# Patient Record
Sex: FEMALE | Race: WHITE | Employment: FULL TIME | URBAN - METROPOLITAN AREA
[De-identification: names, ages, dates, MRNs, and addresses within clinical notes are randomized per-mention and may not be internally consistent; named-entity substitution may affect disease eponyms.]

---

## 2017-01-04 PROBLEM — O13.3 PREGNANCY-INDUCED HYPERTENSION IN THIRD TRIMESTER: Status: ACTIVE | Noted: 2017-01-04

## 2017-03-30 ENCOUNTER — EMPLOYEE WELLNESS (OUTPATIENT)
Dept: OTHER | Age: 27
End: 2017-03-30

## 2017-05-15 ENCOUNTER — OFFICE VISIT (OUTPATIENT)
Dept: FAMILY MEDICINE CLINIC | Age: 27
End: 2017-05-15

## 2017-05-15 VITALS
HEART RATE: 78 BPM | HEIGHT: 64 IN | SYSTOLIC BLOOD PRESSURE: 128 MMHG | BODY MASS INDEX: 28.44 KG/M2 | WEIGHT: 166.6 LBS | DIASTOLIC BLOOD PRESSURE: 72 MMHG | OXYGEN SATURATION: 98 %

## 2017-05-15 DIAGNOSIS — K59.00 CONSTIPATION, UNSPECIFIED CONSTIPATION TYPE: ICD-10-CM

## 2017-05-15 DIAGNOSIS — R19.8 ABDOMINAL WEAKNESS: Primary | ICD-10-CM

## 2017-05-15 PROBLEM — O13.3 PREGNANCY-INDUCED HYPERTENSION IN THIRD TRIMESTER: Status: RESOLVED | Noted: 2017-01-04 | Resolved: 2017-05-15

## 2017-05-15 PROCEDURE — 99203 OFFICE O/P NEW LOW 30 MIN: CPT | Performed by: FAMILY MEDICINE

## 2017-05-15 ASSESSMENT — PATIENT HEALTH QUESTIONNAIRE - PHQ9
SUM OF ALL RESPONSES TO PHQ9 QUESTIONS 1 & 2: 0
2. FEELING DOWN, DEPRESSED OR HOPELESS: 0
1. LITTLE INTEREST OR PLEASURE IN DOING THINGS: 0
SUM OF ALL RESPONSES TO PHQ QUESTIONS 1-9: 0

## 2017-07-27 ENCOUNTER — ANESTHESIA - HEALTHEAST (OUTPATIENT)
Dept: SURGERY | Facility: HOSPITAL | Age: 27
End: 2017-07-27

## 2017-07-27 ASSESSMENT — MIFFLIN-ST. JEOR: SCORE: 1468.28

## 2017-07-28 ENCOUNTER — SURGERY - HEALTHEAST (OUTPATIENT)
Dept: SURGERY | Facility: HOSPITAL | Age: 27
End: 2017-07-28

## 2017-08-18 ENCOUNTER — OFFICE VISIT (OUTPATIENT)
Dept: FAMILY MEDICINE CLINIC | Age: 27
End: 2017-08-18

## 2017-08-18 VITALS
RESPIRATION RATE: 16 BRPM | SYSTOLIC BLOOD PRESSURE: 118 MMHG | BODY MASS INDEX: 28.2 KG/M2 | OXYGEN SATURATION: 98 % | HEIGHT: 64 IN | WEIGHT: 165.2 LBS | HEART RATE: 80 BPM | DIASTOLIC BLOOD PRESSURE: 84 MMHG

## 2017-08-18 DIAGNOSIS — L50.9 URTICARIA: Primary | ICD-10-CM

## 2017-08-18 PROCEDURE — 99212 OFFICE O/P EST SF 10 MIN: CPT | Performed by: FAMILY MEDICINE

## 2017-08-18 RX ORDER — CLOBETASOL PROPIONATE 0.5 MG/G
CREAM TOPICAL 2 TIMES DAILY PRN
Status: ON HOLD | COMMUNITY
End: 2018-11-18 | Stop reason: HOSPADM

## 2017-08-18 RX ORDER — DIAPER,BRIEF,INFANT-TODD,DISP
EACH MISCELLANEOUS 2 TIMES DAILY PRN
Status: ON HOLD | COMMUNITY
End: 2018-11-18 | Stop reason: HOSPADM

## 2017-08-18 RX ORDER — NORETHINDRONE 0.35 MG/1
TABLET ORAL
Refills: 10 | COMMUNITY
Start: 2017-08-07 | End: 2017-12-07

## 2017-12-07 ENCOUNTER — OFFICE VISIT (OUTPATIENT)
Dept: FAMILY MEDICINE CLINIC | Age: 27
End: 2017-12-07

## 2017-12-07 VITALS
DIASTOLIC BLOOD PRESSURE: 70 MMHG | OXYGEN SATURATION: 96 % | RESPIRATION RATE: 14 BRPM | BODY MASS INDEX: 28.36 KG/M2 | HEART RATE: 93 BPM | WEIGHT: 165.2 LBS | TEMPERATURE: 98.3 F | SYSTOLIC BLOOD PRESSURE: 116 MMHG

## 2017-12-07 DIAGNOSIS — J06.9 VIRAL URI: Primary | ICD-10-CM

## 2017-12-07 PROCEDURE — 99213 OFFICE O/P EST LOW 20 MIN: CPT | Performed by: FAMILY MEDICINE

## 2017-12-07 RX ORDER — NORETHINDRONE ACETATE AND ETHINYL ESTRADIOL AND FERROUS FUMARATE 1MG-20(21)
KIT ORAL
Refills: 3 | Status: ON HOLD | COMMUNITY
Start: 2017-10-18 | End: 2018-11-18 | Stop reason: HOSPADM

## 2017-12-07 NOTE — PROGRESS NOTES
SUBJECTIVE:   Robin Montiel is a 28 y.o. female who complains of congestion, sore throat, dry cough, myalgias, headache and nausea and subjective fevers for 3-4 days. She denies a history of chills, vomiting and wheezing. She has been using OTC decongestant and advil at home for the symptoms with and without success. Patient does not smoke cigarettes. Patient has been exposed to someone with similar symptoms. Patient's medications, allergies, past medical, surgical, social and family histories were reviewed and updated as appropriate. Review of Systems  Constitutional: negative for fevers and weight loss  Eyes: negative for irritation and redness  Ears, nose, mouth, throat, and face: negative except for as noted above  Respiratory: negative for hemoptysis and shortness of breath  Gastrointestinal: negative for diarrhea and vomiting     OBJECTIVE:  /70 (Site: Right Arm, Position: Sitting, Cuff Size: Small Adult)   Pulse 93   Temp 98.3 °F (36.8 °C) (Oral)   Resp 14   Wt 165 lb 3.2 oz (74.9 kg)   SpO2 96%   BMI 28.36 kg/m²   She appears well, NARD. TMs normal.  Throat and pharynx normal, without postnasal drip. Rhinorrhea noted. Sinuses are not tender. Neck supple, without adenopathy in the neck. CV: RRR, S1/S2, without m/r/g. Lungs:  clear, without wheezes or rales. ASSESSMENT:   1. Viral URI        PLAN:  1. Supportive/symptomatic care reviewed with the patient. 2. Medications advised: OTC Meds discussed   3. Call or return to clinic 3-5 days if these symptoms worsen or fail to improve as anticipated.

## 2018-02-19 ENCOUNTER — OFFICE VISIT (OUTPATIENT)
Dept: FAMILY MEDICINE CLINIC | Age: 28
End: 2018-02-19

## 2018-02-19 VITALS
WEIGHT: 158.6 LBS | RESPIRATION RATE: 15 BRPM | OXYGEN SATURATION: 98 % | TEMPERATURE: 98.2 F | BODY MASS INDEX: 27.08 KG/M2 | DIASTOLIC BLOOD PRESSURE: 76 MMHG | HEART RATE: 108 BPM | HEIGHT: 64 IN | SYSTOLIC BLOOD PRESSURE: 118 MMHG

## 2018-02-19 DIAGNOSIS — J06.9 VIRAL URI: Primary | ICD-10-CM

## 2018-02-19 LAB
INFLUENZA A ANTIBODY: NORMAL
INFLUENZA B ANTIBODY: NORMAL

## 2018-02-19 PROCEDURE — 87804 INFLUENZA ASSAY W/OPTIC: CPT | Performed by: FAMILY MEDICINE

## 2018-02-19 PROCEDURE — 99213 OFFICE O/P EST LOW 20 MIN: CPT | Performed by: FAMILY MEDICINE

## 2018-02-19 RX ORDER — OSELTAMIVIR PHOSPHATE 75 MG/1
75 CAPSULE ORAL 2 TIMES DAILY
Qty: 10 CAPSULE | Refills: 0 | Status: SHIPPED | OUTPATIENT
Start: 2018-02-19 | End: 2018-02-24

## 2018-02-19 RX ORDER — OSELTAMIVIR PHOSPHATE 75 MG/1
75 CAPSULE ORAL 2 TIMES DAILY
Qty: 10 CAPSULE | Refills: 0 | Status: SHIPPED | OUTPATIENT
Start: 2018-02-19 | End: 2018-02-19 | Stop reason: SDUPTHER

## 2018-02-19 NOTE — PROGRESS NOTES
rhythm and normal heart sounds. No murmur heard. RESPIRATORY: Lungs are clear to auscultation without crackles, wheezes, or rhonchi. Breathing is unlabored. ABDOMEN: No tenderness or guarding or masses felt. Bowel sounds normal       Assessment/Plan:   1. Viral URI  Negative for flu   Symtomatic treatment for the URI symptoms: Tylenol / Advil, salt water gargles, increase fluids. May also use: acetaminophen, ibuprofen, Robitussin DM or Delsym. Can make appointment of symptoms worsen or fail to improve over the next 3-4 days. Most viral illnesses last 7-10 days, and antibiotics do not help.     - POCT Influenza A/B                 Dwain Duverney  2/19/2018 9:43 AM

## 2018-02-19 NOTE — TELEPHONE ENCOUNTER
Patient states that her prescription for Tamiflu was sent to [facility] in error. Please resubmit to [facility].

## 2018-03-12 ENCOUNTER — OFFICE VISIT (OUTPATIENT)
Dept: FAMILY MEDICINE CLINIC | Age: 28
End: 2018-03-12

## 2018-03-12 VITALS
DIASTOLIC BLOOD PRESSURE: 72 MMHG | OXYGEN SATURATION: 98 % | HEART RATE: 74 BPM | WEIGHT: 158.2 LBS | SYSTOLIC BLOOD PRESSURE: 126 MMHG | BODY MASS INDEX: 27.15 KG/M2

## 2018-03-12 DIAGNOSIS — L60.0 INGROWN NAIL OF RIGHT RING FINGER: Primary | ICD-10-CM

## 2018-03-12 PROCEDURE — 99213 OFFICE O/P EST LOW 20 MIN: CPT | Performed by: FAMILY MEDICINE

## 2018-03-12 RX ORDER — CRISABOROLE 20 MG/G
OINTMENT TOPICAL
Refills: 5 | Status: ON HOLD | COMMUNITY
Start: 2018-03-08 | End: 2018-11-18 | Stop reason: HOSPADM

## 2018-03-12 RX ORDER — CEPHALEXIN 500 MG/1
500 CAPSULE ORAL 3 TIMES DAILY
Qty: 21 CAPSULE | Refills: 0 | Status: SHIPPED | OUTPATIENT
Start: 2018-03-12 | End: 2018-03-19

## 2018-03-12 NOTE — PROGRESS NOTES
Chief Complaint: Finger Injury (right finger blue, tried neosporin, soaking, swollen and purple for x 2weeks) and Otitis Media (fluid in her right ear, discomfort)       HPI  Kirstie Vann is a 28 y.o. female presenting with swelling and redness in her right ring finger. She has soaked and applied topical antibiotics and nothing has helped. She does get ingrown nails. She also recently had flu since then her Ears  been hurting. She wanted to get checked. Patient's problem list, medications, allergies, past medical, surgical, social and family histories were reviewed and updated as appropriate. Social History   Substance Use Topics    Smoking status: Never Smoker    Smokeless tobacco: Never Used    Alcohol use 2.4 oz/week     4 Standard drinks or equivalent per week        Review of Systems   ROS:  Constitutional: Negative for appetite change, unexpected weight change and fatigue. HENT: as mentioned in the HPI    Eyes: Negative for photophobia, discharge, redness and visual disturbance. Respiratory: Negative  Cardiovascular: Negative for chest pain, palpitations and leg swelling. Gastrointestinal: Negative for abdominal pain, blood in stool, constipation, diarrhea, nausea and vomiting. Musculoskeletal: Negative for gait problem, joint swelling and myalgias. Skin: As mentioned above     Objective:     Vitals:    03/12/18 1619   BP: 126/72   Pulse: 74   SpO2: 98%   Weight: 158 lb 3.2 oz (71.8 kg)        Physical Exam  GENERAL: She is oriented to person, place, and time. She appears well-developed and well-nourished, and in no acute distress. HEAD: Normocephalic, atraumatic. EYES: Right eye clear. Left eye clear. Pupils are equal, round, and reactive to light. EARS: Right:TM appears Normal and minimal fluid noticed . External canal appears normal.  Left:TM appears normal  NOSE: normal  THROAT: Uvula is midline, oropharynx is clear and moist and with mild erythema.   No tonsillar

## 2018-03-20 VITALS — BODY MASS INDEX: 29.05 KG/M2 | WEIGHT: 164 LBS

## 2018-04-05 ENCOUNTER — EMPLOYEE WELLNESS (OUTPATIENT)
Dept: OTHER | Age: 28
End: 2018-04-05

## 2018-04-05 LAB
CHOLESTEROL, TOTAL: 150 MG/DL (ref 0–199)
GLUCOSE BLD-MCNC: 84 MG/DL (ref 70–99)
HDLC SERPL-MCNC: 61 MG/DL (ref 40–60)
LDL CHOLESTEROL CALCULATED: 82 MG/DL
TRIGL SERPL-MCNC: 37 MG/DL (ref 0–150)

## 2018-04-10 VITALS — BODY MASS INDEX: 26.61 KG/M2 | WEIGHT: 155 LBS

## 2018-04-13 LAB
ABO/RH: NORMAL
ANTIBODY SCREEN: NORMAL
HEPATITIS C ANTIBODY INTERPRETATION: NORMAL

## 2018-04-14 LAB
BASOPHILS ABSOLUTE: 0 K/UL (ref 0–0.2)
BASOPHILS RELATIVE PERCENT: 0.3 %
EOSINOPHILS ABSOLUTE: 0.2 K/UL (ref 0–0.6)
EOSINOPHILS RELATIVE PERCENT: 2 %
HCT VFR BLD CALC: 39 % (ref 36–48)
HEMOGLOBIN: 13.3 G/DL (ref 12–16)
HEPATITIS B SURFACE ANTIGEN INTERPRETATION: NORMAL
LYMPHOCYTES ABSOLUTE: 2.4 K/UL (ref 1–5.1)
LYMPHOCYTES RELATIVE PERCENT: 30.5 %
MCH RBC QN AUTO: 29.5 PG (ref 26–34)
MCHC RBC AUTO-ENTMCNC: 34.1 G/DL (ref 31–36)
MCV RBC AUTO: 86.5 FL (ref 80–100)
MONOCYTES ABSOLUTE: 0.6 K/UL (ref 0–1.3)
MONOCYTES RELATIVE PERCENT: 8 %
NEUTROPHILS ABSOLUTE: 4.7 K/UL (ref 1.7–7.7)
NEUTROPHILS RELATIVE PERCENT: 59.2 %
PDW BLD-RTO: 13.3 % (ref 12.4–15.4)
PLATELET # BLD: 245 K/UL (ref 135–450)
PMV BLD AUTO: 9.4 FL (ref 5–10.5)
RBC # BLD: 4.51 M/UL (ref 4–5.2)
RPR: NORMAL
RUBELLA ANTIBODY IGG: 113 IU/ML
WBC # BLD: 8 K/UL (ref 4–11)

## 2018-04-16 LAB
HIV AG/AB: NORMAL
HIV ANTIGEN: NORMAL
HIV-1 ANTIBODY: NORMAL
HIV-2 AB: NORMAL

## 2018-08-23 LAB
BASOPHILS ABSOLUTE: 0 K/UL (ref 0–0.2)
BASOPHILS RELATIVE PERCENT: 0.3 %
EOSINOPHILS ABSOLUTE: 0.2 K/UL (ref 0–0.6)
EOSINOPHILS RELATIVE PERCENT: 2.6 %
GLUCOSE CHALLENGE: 121 MG/DL
HCT VFR BLD CALC: 37.4 % (ref 36–48)
HEMOGLOBIN: 12.5 G/DL (ref 12–16)
LYMPHOCYTES ABSOLUTE: 2 K/UL (ref 1–5.1)
LYMPHOCYTES RELATIVE PERCENT: 25.3 %
MCH RBC QN AUTO: 29.6 PG (ref 26–34)
MCHC RBC AUTO-ENTMCNC: 33.4 G/DL (ref 31–36)
MCV RBC AUTO: 88.9 FL (ref 80–100)
MONOCYTES ABSOLUTE: 0.6 K/UL (ref 0–1.3)
MONOCYTES RELATIVE PERCENT: 7 %
NEUTROPHILS ABSOLUTE: 5.1 K/UL (ref 1.7–7.7)
NEUTROPHILS RELATIVE PERCENT: 64.8 %
PDW BLD-RTO: 13.1 % (ref 12.4–15.4)
PLATELET # BLD: 214 K/UL (ref 135–450)
PMV BLD AUTO: 10 FL (ref 5–10.5)
RBC # BLD: 4.21 M/UL (ref 4–5.2)
WBC # BLD: 7.8 K/UL (ref 4–11)

## 2018-11-18 ENCOUNTER — ANESTHESIA (OUTPATIENT)
Dept: LABOR AND DELIVERY | Age: 28
End: 2018-11-18
Payer: COMMERCIAL

## 2018-11-18 ENCOUNTER — HOSPITAL ENCOUNTER (INPATIENT)
Age: 28
LOS: 1 days | Discharge: HOME | End: 2018-11-19
Attending: OBSTETRICS & GYNECOLOGY | Admitting: OBSTETRICS & GYNECOLOGY
Payer: COMMERCIAL

## 2018-11-18 ENCOUNTER — ANESTHESIA EVENT (OUTPATIENT)
Dept: LABOR AND DELIVERY | Age: 28
End: 2018-11-18
Payer: COMMERCIAL

## 2018-11-18 PROBLEM — Z37.9 NORMAL LABOR: Status: ACTIVE | Noted: 2018-11-18

## 2018-11-18 LAB
AMPHETAMINE SCREEN, URINE: NORMAL
BARBITURATE SCREEN URINE: NORMAL
BASOPHILS ABSOLUTE: 0 K/UL (ref 0–0.2)
BASOPHILS RELATIVE PERCENT: 0.4 %
BENZODIAZEPINE SCREEN, URINE: NORMAL
BUPRENORPHINE URINE: NORMAL
CANNABINOID SCREEN URINE: NORMAL
COCAINE METABOLITE SCREEN URINE: NORMAL
EOSINOPHILS ABSOLUTE: 0.1 K/UL (ref 0–0.6)
EOSINOPHILS RELATIVE PERCENT: 1.5 %
HCT VFR BLD CALC: 39 % (ref 36–48)
HEMOGLOBIN: 13 G/DL (ref 12–16)
LYMPHOCYTES ABSOLUTE: 2 K/UL (ref 1–5.1)
LYMPHOCYTES RELATIVE PERCENT: 20.7 %
Lab: NORMAL
MCH RBC QN AUTO: 28.9 PG (ref 26–34)
MCHC RBC AUTO-ENTMCNC: 33.3 G/DL (ref 31–36)
MCV RBC AUTO: 86.6 FL (ref 80–100)
METHADONE SCREEN, URINE: NORMAL
MONOCYTES ABSOLUTE: 0.7 K/UL (ref 0–1.3)
MONOCYTES RELATIVE PERCENT: 6.9 %
NEUTROPHILS ABSOLUTE: 6.7 K/UL (ref 1.7–7.7)
NEUTROPHILS RELATIVE PERCENT: 70.5 %
OPIATE SCREEN URINE: NORMAL
OXYCODONE URINE: NORMAL
PDW BLD-RTO: 12.6 % (ref 12.4–15.4)
PH UA: 6
PHENCYCLIDINE SCREEN URINE: NORMAL
PLATELET # BLD: 196 K/UL (ref 135–450)
PMV BLD AUTO: 9.8 FL (ref 5–10.5)
PROPOXYPHENE SCREEN: NORMAL
RBC # BLD: 4.5 M/UL (ref 4–5.2)
SPECIMEN STATUS: NORMAL
WBC # BLD: 9.4 K/UL (ref 4–11)

## 2018-11-18 PROCEDURE — 6370000000 HC RX 637 (ALT 250 FOR IP): Performed by: OBSTETRICS & GYNECOLOGY

## 2018-11-18 PROCEDURE — 85025 COMPLETE CBC W/AUTO DIFF WBC: CPT

## 2018-11-18 PROCEDURE — 2580000003 HC RX 258: Performed by: OBSTETRICS & GYNECOLOGY

## 2018-11-18 PROCEDURE — 1220000000 HC SEMI PRIVATE OB R&B

## 2018-11-18 PROCEDURE — 7200000001 HC VAGINAL DELIVERY

## 2018-11-18 PROCEDURE — 3700000025 ANESTHESIA EPIDURAL BLOCK: Performed by: ANESTHESIOLOGY

## 2018-11-18 PROCEDURE — 2500000003 HC RX 250 WO HCPCS: Performed by: NURSE ANESTHETIST, CERTIFIED REGISTERED

## 2018-11-18 PROCEDURE — 51702 INSERT TEMP BLADDER CATH: CPT

## 2018-11-18 PROCEDURE — 86780 TREPONEMA PALLIDUM: CPT

## 2018-11-18 PROCEDURE — 6360000002 HC RX W HCPCS: Performed by: OBSTETRICS & GYNECOLOGY

## 2018-11-18 PROCEDURE — 80307 DRUG TEST PRSMV CHEM ANLYZR: CPT

## 2018-11-18 PROCEDURE — 3E033VJ INTRODUCTION OF OTHER HORMONE INTO PERIPHERAL VEIN, PERCUTANEOUS APPROACH: ICD-10-PCS | Performed by: OBSTETRICS & GYNECOLOGY

## 2018-11-18 PROCEDURE — 0KQM0ZZ REPAIR PERINEUM MUSCLE, OPEN APPROACH: ICD-10-PCS | Performed by: OBSTETRICS & GYNECOLOGY

## 2018-11-18 PROCEDURE — 3E0R3BZ INTRODUCTION OF ANESTHETIC AGENT INTO SPINAL CANAL, PERCUTANEOUS APPROACH: ICD-10-PCS | Performed by: OBSTETRICS & GYNECOLOGY

## 2018-11-18 RX ORDER — SODIUM CHLORIDE 0.9 % (FLUSH) 0.9 %
10 SYRINGE (ML) INJECTION EVERY 12 HOURS SCHEDULED
Status: DISCONTINUED | OUTPATIENT
Start: 2018-11-18 | End: 2018-11-18

## 2018-11-18 RX ORDER — MISOPROSTOL 100 UG/1
800 TABLET ORAL PRN
Status: DISCONTINUED | OUTPATIENT
Start: 2018-11-18 | End: 2018-11-19 | Stop reason: HOSPADM

## 2018-11-18 RX ORDER — ONDANSETRON 4 MG/1
4 TABLET, ORALLY DISINTEGRATING ORAL EVERY 6 HOURS PRN
Status: DISCONTINUED | OUTPATIENT
Start: 2018-11-18 | End: 2018-11-19 | Stop reason: HOSPADM

## 2018-11-18 RX ORDER — DOCUSATE SODIUM 100 MG/1
100 CAPSULE, LIQUID FILLED ORAL 2 TIMES DAILY
Status: DISCONTINUED | OUTPATIENT
Start: 2018-11-18 | End: 2018-11-19 | Stop reason: HOSPADM

## 2018-11-18 RX ORDER — FERROUS SULFATE 325(65) MG
325 TABLET ORAL DAILY
Status: DISCONTINUED | OUTPATIENT
Start: 2018-11-18 | End: 2018-11-19 | Stop reason: HOSPADM

## 2018-11-18 RX ORDER — SODIUM CHLORIDE 0.9 % (FLUSH) 0.9 %
10 SYRINGE (ML) INJECTION PRN
Status: DISCONTINUED | OUTPATIENT
Start: 2018-11-18 | End: 2018-11-19 | Stop reason: HOSPADM

## 2018-11-18 RX ORDER — ACETAMINOPHEN 325 MG/1
650 TABLET ORAL EVERY 4 HOURS PRN
Status: DISCONTINUED | OUTPATIENT
Start: 2018-11-18 | End: 2018-11-19 | Stop reason: HOSPADM

## 2018-11-18 RX ORDER — BUTORPHANOL TARTRATE 1 MG/ML
1 INJECTION, SOLUTION INTRAMUSCULAR; INTRAVENOUS
Status: DISCONTINUED | OUTPATIENT
Start: 2018-11-18 | End: 2018-11-18

## 2018-11-18 RX ORDER — ONDANSETRON 2 MG/ML
4 INJECTION INTRAMUSCULAR; INTRAVENOUS EVERY 6 HOURS PRN
Status: DISCONTINUED | OUTPATIENT
Start: 2018-11-18 | End: 2018-11-18

## 2018-11-18 RX ORDER — OXYCODONE HYDROCHLORIDE AND ACETAMINOPHEN 5; 325 MG/1; MG/1
1 TABLET ORAL EVERY 4 HOURS PRN
Status: DISCONTINUED | OUTPATIENT
Start: 2018-11-18 | End: 2018-11-19 | Stop reason: HOSPADM

## 2018-11-18 RX ORDER — METHYLERGONOVINE MALEATE 0.2 MG/ML
200 INJECTION INTRAVENOUS PRN
Status: DISCONTINUED | OUTPATIENT
Start: 2018-11-18 | End: 2018-11-18

## 2018-11-18 RX ORDER — OXYCODONE HYDROCHLORIDE AND ACETAMINOPHEN 5; 325 MG/1; MG/1
2 TABLET ORAL EVERY 4 HOURS PRN
Status: DISCONTINUED | OUTPATIENT
Start: 2018-11-18 | End: 2018-11-19 | Stop reason: HOSPADM

## 2018-11-18 RX ORDER — DIPHENHYDRAMINE HYDROCHLORIDE 50 MG/ML
25 INJECTION INTRAMUSCULAR; INTRAVENOUS EVERY 4 HOURS PRN
Status: DISCONTINUED | OUTPATIENT
Start: 2018-11-18 | End: 2018-11-18

## 2018-11-18 RX ORDER — POLYETHYLENE GLYCOL 3350 17 G/17G
17 POWDER, FOR SOLUTION ORAL DAILY
Status: DISCONTINUED | OUTPATIENT
Start: 2018-11-18 | End: 2018-11-19 | Stop reason: HOSPADM

## 2018-11-18 RX ORDER — SODIUM CHLORIDE 0.9 % (FLUSH) 0.9 %
10 SYRINGE (ML) INJECTION PRN
Status: DISCONTINUED | OUTPATIENT
Start: 2018-11-18 | End: 2018-11-18

## 2018-11-18 RX ORDER — CARBOPROST TROMETHAMINE 250 UG/ML
250 INJECTION, SOLUTION INTRAMUSCULAR
Status: ACTIVE | OUTPATIENT
Start: 2018-11-18 | End: 2018-11-18

## 2018-11-18 RX ORDER — CARBOPROST TROMETHAMINE 250 UG/ML
250 INJECTION, SOLUTION INTRAMUSCULAR PRN
Status: DISCONTINUED | OUTPATIENT
Start: 2018-11-18 | End: 2018-11-18

## 2018-11-18 RX ORDER — IBUPROFEN 800 MG/1
800 TABLET ORAL EVERY 6 HOURS PRN
Qty: 40 TABLET | Refills: 0 | Status: SHIPPED | OUTPATIENT
Start: 2018-11-18 | End: 2021-05-27 | Stop reason: ALTCHOICE

## 2018-11-18 RX ORDER — SODIUM CHLORIDE, SODIUM LACTATE, POTASSIUM CHLORIDE, CALCIUM CHLORIDE 600; 310; 30; 20 MG/100ML; MG/100ML; MG/100ML; MG/100ML
INJECTION, SOLUTION INTRAVENOUS CONTINUOUS
Status: DISCONTINUED | OUTPATIENT
Start: 2018-11-18 | End: 2018-11-18

## 2018-11-18 RX ORDER — METHYLERGONOVINE MALEATE 0.2 MG/ML
200 INJECTION INTRAVENOUS
Status: ACTIVE | OUTPATIENT
Start: 2018-11-18 | End: 2018-11-18

## 2018-11-18 RX ORDER — ACETAMINOPHEN 325 MG/1
650 TABLET ORAL EVERY 4 HOURS PRN
Status: DISCONTINUED | OUTPATIENT
Start: 2018-11-18 | End: 2018-11-18

## 2018-11-18 RX ORDER — SODIUM CHLORIDE 0.9 % (FLUSH) 0.9 %
10 SYRINGE (ML) INJECTION EVERY 12 HOURS SCHEDULED
Status: DISCONTINUED | OUTPATIENT
Start: 2018-11-18 | End: 2018-11-19 | Stop reason: HOSPADM

## 2018-11-18 RX ORDER — SENNA AND DOCUSATE SODIUM 50; 8.6 MG/1; MG/1
1 TABLET, FILM COATED ORAL DAILY PRN
Status: DISCONTINUED | OUTPATIENT
Start: 2018-11-18 | End: 2018-11-19 | Stop reason: HOSPADM

## 2018-11-18 RX ORDER — LIDOCAINE HYDROCHLORIDE 10 MG/ML
30 INJECTION, SOLUTION EPIDURAL; INFILTRATION; INTRACAUDAL; PERINEURAL PRN
Status: DISCONTINUED | OUTPATIENT
Start: 2018-11-18 | End: 2018-11-18

## 2018-11-18 RX ORDER — SIMETHICONE 80 MG
80 TABLET,CHEWABLE ORAL EVERY 6 HOURS PRN
Status: DISCONTINUED | OUTPATIENT
Start: 2018-11-18 | End: 2018-11-19 | Stop reason: HOSPADM

## 2018-11-18 RX ORDER — LIDOCAINE HYDROCHLORIDE AND EPINEPHRINE 20; 5 MG/ML; UG/ML
INJECTION, SOLUTION EPIDURAL; INFILTRATION; INTRACAUDAL; PERINEURAL PRN
Status: DISCONTINUED | OUTPATIENT
Start: 2018-11-18 | End: 2018-11-18 | Stop reason: SDUPTHER

## 2018-11-18 RX ORDER — MISOPROSTOL 100 UG/1
800 TABLET ORAL PRN
Status: DISCONTINUED | OUTPATIENT
Start: 2018-11-18 | End: 2018-11-18

## 2018-11-18 RX ORDER — FAMOTIDINE 20 MG/1
20 TABLET, FILM COATED ORAL 2 TIMES DAILY PRN
Status: DISCONTINUED | OUTPATIENT
Start: 2018-11-18 | End: 2018-11-19 | Stop reason: HOSPADM

## 2018-11-18 RX ORDER — OXYCODONE HYDROCHLORIDE 5 MG/1
5 TABLET ORAL EVERY 6 HOURS PRN
Qty: 15 TABLET | Refills: 0 | Status: SHIPPED | OUTPATIENT
Start: 2018-11-18 | End: 2018-11-25

## 2018-11-18 RX ORDER — LANOLIN 100 %
OINTMENT (GRAM) TOPICAL PRN
Status: DISCONTINUED | OUTPATIENT
Start: 2018-11-18 | End: 2018-11-19 | Stop reason: HOSPADM

## 2018-11-18 RX ORDER — IBUPROFEN 600 MG/1
600 TABLET ORAL EVERY 6 HOURS SCHEDULED
Status: DISCONTINUED | OUTPATIENT
Start: 2018-11-18 | End: 2018-11-19 | Stop reason: HOSPADM

## 2018-11-18 RX ORDER — ONDANSETRON 2 MG/ML
4 INJECTION INTRAMUSCULAR; INTRAVENOUS EVERY 6 HOURS PRN
Status: DISCONTINUED | OUTPATIENT
Start: 2018-11-18 | End: 2018-11-19 | Stop reason: HOSPADM

## 2018-11-18 RX ORDER — BUPIVACAINE HYDROCHLORIDE 2.5 MG/ML
INJECTION, SOLUTION EPIDURAL; INFILTRATION; INTRACAUDAL PRN
Status: DISCONTINUED | OUTPATIENT
Start: 2018-11-18 | End: 2018-11-18 | Stop reason: SDUPTHER

## 2018-11-18 RX ORDER — TERBUTALINE SULFATE 1 MG/ML
0.25 INJECTION, SOLUTION SUBCUTANEOUS ONCE
Status: DISCONTINUED | OUTPATIENT
Start: 2018-11-18 | End: 2018-11-18

## 2018-11-18 RX ADMIN — SODIUM CHLORIDE, POTASSIUM CHLORIDE, SODIUM LACTATE AND CALCIUM CHLORIDE: 600; 310; 30; 20 INJECTION, SOLUTION INTRAVENOUS at 10:55

## 2018-11-18 RX ADMIN — DOCUSATE SODIUM 100 MG: 100 CAPSULE, LIQUID FILLED ORAL at 22:03

## 2018-11-18 RX ADMIN — SODIUM CHLORIDE, POTASSIUM CHLORIDE, SODIUM LACTATE AND CALCIUM CHLORIDE: 600; 310; 30; 20 INJECTION, SOLUTION INTRAVENOUS at 12:26

## 2018-11-18 RX ADMIN — HYDROCORTISONE: 25 CREAM TOPICAL at 22:03

## 2018-11-18 RX ADMIN — ONDANSETRON 4 MG: 2 INJECTION INTRAMUSCULAR; INTRAVENOUS at 15:55

## 2018-11-18 RX ADMIN — LIDOCAINE HYDROCHLORIDE AND EPINEPHRINE 3 ML: 20; 5 INJECTION, SOLUTION EPIDURAL; INFILTRATION; INTRACAUDAL; PERINEURAL at 13:13

## 2018-11-18 RX ADMIN — BUPIVACAINE HYDROCHLORIDE 5 ML: 2.5 INJECTION, SOLUTION EPIDURAL; INFILTRATION; INTRACAUDAL; PERINEURAL at 13:17

## 2018-11-18 RX ADMIN — Medication 1 MILLI-UNITS/MIN: at 11:02

## 2018-11-18 RX ADMIN — Medication 12 ML/HR: at 13:17

## 2018-11-18 RX ADMIN — MAGNESIUM HYDROXIDE 30 ML: 400 SUSPENSION ORAL at 22:04

## 2018-11-18 RX ADMIN — IBUPROFEN 600 MG: 600 TABLET, FILM COATED ORAL at 17:57

## 2018-11-18 ASSESSMENT — PAIN SCALES - GENERAL: PAINLEVEL_OUTOF10: 0

## 2018-11-18 NOTE — PLAN OF CARE
Problem: Anxiety:  Goal: Level of anxiety will decrease  Level of anxiety will decrease   Outcome: Ongoing      Problem: Breathing Pattern - Ineffective:  Goal: Able to breathe comfortably  Able to breathe comfortably   Outcome: Ongoing      Problem: Infection - Intrapartum Infection:  Goal: Will show no infection signs and symptoms  Will show no infection signs and symptoms   Outcome: Ongoing      Problem: Labor Process - Prolonged:  Goal: Labor progression, first stage, within specified pattern  Labor progression, first stage, within specified pattern   Outcome: Ongoing    Goal: Uterine contractions within specified parameters  Uterine contractions within specified parameters   Outcome: Ongoing      Problem: Pain - Acute:  Goal: Pain level will decrease  Pain level will decrease   Outcome: Ongoing    Goal: Able to cope with pain  Able to cope with pain   Outcome: Ongoing      Problem: Tissue Perfusion - Uteroplacental, Altered:  Goal: Absence of abnormal fetal heart rate pattern  Absence of abnormal fetal heart rate pattern   Outcome: Ongoing      Problem: Urinary Retention:  Goal: Experiences of bladder distention will decrease  Experiences of bladder distention will decrease   Outcome: Ongoing    Goal: Urinary elimination within specified parameters  Urinary elimination within specified parameters   Outcome: Ongoing

## 2018-11-18 NOTE — ANESTHESIA PROCEDURE NOTES
Epidural Block    Patient location during procedure: OB  Start time: 2018 12:52 PM  End time: 2018 1:13 PM  Reason for block: labor epidural  Staffing  Anesthesiologist: MIKE KIRKLAND  Resident/CRNA: Halina Boyd  Performed: resident/CRNA   Preanesthetic Checklist  Completed: patient identified, site marked, surgical consent, pre-op evaluation, timeout performed, IV checked, risks and benefits discussed, monitors and equipment checked, anesthesia consent given, oxygen available and patient being monitored  Epidural  Patient position: sitting  Prep: ChloraPrep  Patient monitoring: continuous pulse ox and frequent blood pressure checks  Approach: midline  Location: lumbar (1-5)  Injection technique: DARCY saline  Provider prep: sterile gloves and mask  Needle  Needle type: Tuohy   Needle gauge: 17 G  Needle length: 3.5 in  Needle insertion depth: 6 cm  Catheter type: side hole  Catheter size: 18 G  Catheter at skin depth: 11 cm  Test dose: negative  Assessment  Sensory level: T10  Hemodynamics: stable  Attempts: 1  Additional Notes  Procedure(labor epidural):    Called at 1252 for labor epidural analgesia request. Patient identified, informed consent obtained, and timeout performed. Medical and Surgical history reviewed with pt. Risks/benefits/alternatives of epidural discussed including allergic reaction, infection, bleeding, hypotension, headache, back pain, nerve damage, failed or one-sided block. Also discussed anesthesia options and associated risks in the event of . All questions answered. Verbalizes understanding and requests to proceed. VSS:  Stable throughout      Pt in sitting position. Labor epidural placed using DARCY sterile technique (donned mask, hat, and sterile gloves). Back prepped with Chloraprepx2. Sterile drape applied. Site: L3-4  DARCY:  6cm. Attempts:  1  Re-directs: 0  Site infiltrated with 3ml 1%Lidocaine(25g).  17G Tuohy needle inserted, DARCY technique with saline. Epidural space dilated with saline. Threaded spring wound epidural catheter through Tuohy needle easily. No heme, CSF, pain with injection, or paresthesias noted. Tuohy needle withdrawn. Test Dose: at 1313. Negative aspiration or pain with injection. 3cc of 1.5% Lido with epi 1:200,000 test dose given. Negative test dose. Skin:  11cm catheter taped at the skin. Secured with steri strips, tegaderm, and tape. Bolus Dose: at 1317. 10ml of 0.125% Marcaine over 2 minutes  Infusion: at 1317. 0.15% Ropivacaine with Fentanyl (2ug/ml)  Auto bolus 4 ml every 20 minutes. (Max. Dose- 40 ml/hr.)      Sensory Level:  R:  T10  L:  T10    Motor: 4/5  VAS: start 8/10, end 2-3/10. Stated comfort. Patient in supine/HOB position with left uterine displacement. VSS.

## 2018-11-18 NOTE — H&P
Department of Obstetrics and Gynecology   Obstetrics History and Physical        CHIEF COMPLAINT:  Elective IOL at patient reques    HISTORY OF PRESENT ILLNESS:      The patient is a 33 y.o. female at 39w0d. OB History      Para Term  AB Living    2 1 1 0 0 1    SAB TAB Ectopic Molar Multiple Live Births    0 0 0   0 1      Patient presents with a chief complaint as above and is being admitted for induction. States that as a G1 had a poor experience with FAVD (7.7lb, OP), 3rd degree tear, prolonged recovery. Desires elective IOL prefers to defer operative vaginal delivery, would rather have a CS unless emergent need for op vag delivery    Estimated Due Date: Estimated Date of Delivery: 18    PRENATAL CARE:    Complicated by: none    PAST OB HISTORY:  OB History      Para Term  AB Living    2 1 1 0 0 1    SAB TAB Ectopic Molar Multiple Live Births    0 0 0   0 1          Past Medical History:        Diagnosis Date    Eczema     External hemorrhoids     Pregnancy induced hypertension     with 1st pregnancy, no problems with this pregnancy    Pyelonephritis      Past Surgical History:        Procedure Laterality Date    WISDOM TOOTH EXTRACTION       Allergies:  Patient has no known allergies.     Social History:    Social History     Social History    Marital status:      Spouse name: N/A    Number of children: N/A    Years of education: N/A     Occupational History    speech therapist      Social History Main Topics    Smoking status: Never Smoker    Smokeless tobacco: Never Used    Alcohol use No    Drug use: No    Sexual activity: Yes     Partners: Male     Birth control/ protection: OCP     Other Topics Concern    Not on file     Social History Narrative    No narrative on file     Family History:   Family History   Problem Relation Age of Onset    High Blood Pressure Mother     High Blood Pressure Father     High Blood Pressure Maternal Grandfather

## 2018-11-18 NOTE — ANESTHESIA PRE PROCEDURE
Time of last solid consumption: 0830                        Date of last liquid consumption: 11/18/18                        Date of last solid food consumption: 11/18/18    BMI:   Wt Readings from Last 3 Encounters:   11/18/18 181 lb (82.1 kg)   04/05/18 155 lb (70.3 kg)   03/12/18 158 lb 3.2 oz (71.8 kg)     Body mass index is 32.06 kg/m². CBC:   Lab Results   Component Value Date    WBC 9.4 11/18/2018    RBC 4.50 11/18/2018    HGB 13.0 11/18/2018    HCT 39.0 11/18/2018    MCV 86.6 11/18/2018    RDW 12.6 11/18/2018     11/18/2018       CMP:   Lab Results   Component Value Date     01/04/2017    K 3.9 01/04/2017    CL 99 01/04/2017    CO2 21 01/04/2017    BUN 9 01/04/2017    CREATININE <0.5 01/04/2017    GFRAA >60 01/04/2017    AGRATIO 1.1 01/04/2017    LABGLOM >60 01/04/2017    GLUCOSE 84 04/05/2018    PROT 6.4 01/04/2017    CALCIUM 9.6 01/04/2017    BILITOT <0.2 01/04/2017    ALKPHOS 152 01/04/2017    AST 21 01/04/2017    ALT 21 01/04/2017       POC Tests: No results for input(s): POCGLU, POCNA, POCK, POCCL, POCBUN, POCHEMO, POCHCT in the last 72 hours.     Coags:   Lab Results   Component Value Date    PROTIME 10.2 12/26/2016    INR 0.90 12/26/2016    APTT 27.1 12/26/2016       HCG (If Applicable): No results found for: PREGTESTUR, PREGSERUM, HCG, HCGQUANT     ABGs: No results found for: PHART, PO2ART, QNW2CFF, VSJ2JVC, BEART, D4HRWLPU     Type & Screen (If Applicable):  No results found for: LABABO, [address]    Anesthesia Evaluation  Patient summary reviewed and Nursing notes reviewed  Airway: Mallampati: II  TM distance: >3 FB   Neck ROM: full  Mouth opening: > = 3 FB Dental: normal exam         Pulmonary:Negative Pulmonary ROS and normal exam  breath sounds clear to auscultation                             Cardiovascular:Negative CV ROS  Exercise tolerance: good (>4 METS),         NYHA Classification: I    Rhythm: regular  Rate: normal           Beta Blocker:  Not on Beta Blocker      ROS comment: Hx PIH     Neuro/Psych:   Negative Neuro/Psych ROS              GI/Hepatic/Renal: Neg GI/Hepatic/Renal ROS  (+) renal disease: kidney stones,           Endo/Other: Negative Endo/Other ROS             Pt had no PAT visit       Abdominal:           Vascular: negative vascular ROS. Anesthesia Plan      general, epidural and spinal     ASA 2       Induction: rapid sequence. Anesthetic plan and risks discussed with patient. Use of blood products discussed with patient whom. Patient request pain control for labor and delivery. Discussed procedure (epidural,spinal, general and non regional options), alternatives, benefits, risks, and  options including but not limited to changes in VSS, allergic reaction, infection, intravascular injection, paresthesia, n/v, severe headache, temporary or permanent rare neurologic sequelae,  and failed block. All questions answered and states understanding. Patient agrees to proceed. Choice of anesthetic will be determined by clinical condition at the time of anesthesia initiation.         KATIA Delgado - CRNA   2018

## 2018-11-18 NOTE — FLOWSHEET NOTE
at bedside at [phone], reviewed efm strip and performed SVE. Dr Michelle Calderon remains at bedside reviewing efm strip.

## 2018-11-19 VITALS
OXYGEN SATURATION: 96 % | DIASTOLIC BLOOD PRESSURE: 84 MMHG | BODY MASS INDEX: 32.07 KG/M2 | TEMPERATURE: 98 F | WEIGHT: 181 LBS | SYSTOLIC BLOOD PRESSURE: 129 MMHG | RESPIRATION RATE: 16 BRPM | HEIGHT: 63 IN | HEART RATE: 85 BPM

## 2018-11-19 LAB — TOTAL SYPHILLIS IGG/IGM: NORMAL

## 2018-11-19 PROCEDURE — 6370000000 HC RX 637 (ALT 250 FOR IP): Performed by: OBSTETRICS & GYNECOLOGY

## 2018-11-19 RX ADMIN — POLYETHYLENE GLYCOL 3350 17 G: 17 POWDER, FOR SOLUTION ORAL at 08:58

## 2018-11-19 RX ADMIN — IBUPROFEN 600 MG: 600 TABLET, FILM COATED ORAL at 12:34

## 2018-11-19 RX ADMIN — ACETAMINOPHEN 650 MG: 325 TABLET, FILM COATED ORAL at 01:05

## 2018-11-19 RX ADMIN — DOCUSATE SODIUM 100 MG: 100 CAPSULE, LIQUID FILLED ORAL at 08:57

## 2018-11-19 RX ADMIN — IBUPROFEN 600 MG: 600 TABLET, FILM COATED ORAL at 18:26

## 2018-11-19 RX ADMIN — IBUPROFEN 600 MG: 600 TABLET, FILM COATED ORAL at 05:13

## 2018-11-19 ASSESSMENT — PAIN DESCRIPTION - LOCATION: LOCATION: ABDOMEN

## 2018-11-19 ASSESSMENT — PAIN SCALES - GENERAL
PAINLEVEL_OUTOF10: 0
PAINLEVEL_OUTOF10: 0
PAINLEVEL_OUTOF10: 2
PAINLEVEL_OUTOF10: 4
PAINLEVEL_OUTOF10: 2
PAINLEVEL_OUTOF10: 4

## 2018-11-19 ASSESSMENT — PAIN DESCRIPTION - ORIENTATION: ORIENTATION: LOWER

## 2018-11-19 ASSESSMENT — PAIN DESCRIPTION - PAIN TYPE: TYPE: ACUTE PAIN

## 2018-11-19 ASSESSMENT — PAIN DESCRIPTION - DESCRIPTORS: DESCRIPTORS: CRAMPING;SORE

## 2018-11-19 NOTE — FLOWSHEET NOTE
Patient transferred to postpartum by self and settled into postpartum room. Pt oriented to folder and postpartum care. Oriented to call light, phone and ordering meals. Postpartum RN's name and phone number posted for pt. Siderails up x2. Pt oriented to equipment. Report given. Pt included in discussion and all questions answered.

## 2018-11-19 NOTE — ANESTHESIA POSTPROCEDURE EVALUATION
Department of Anesthesiology  Postprocedure Note    Patient: Kolby Miranda  MRN: [id number]  YOB: 1990  Date of evaluation: 11/19/2018  Time:  1:02 PM     Procedure Summary     Date:  11/18/18 Room / Location:      Anesthesia Start:  1252 Anesthesia Stop:  1631    Procedure:  ANESTHESIA LABOR ANALGESIA Diagnosis:      Scheduled Providers:   Responsible Provider:  Braxton Urbina MD    Anesthesia Type:  general, epidural, spinal ASA Status:  2          Anesthesia Type: general, epidural, spinal    Maximo Phase I:      Maximo Phase II:      Last vitals: Reviewed and per EMR flowsheets. Anesthesia Post Evaluation    Patient location during evaluation: floor  Patient participation: complete - patient participated  Level of consciousness: awake and alert  Pain score: 0  Airway patency: patent  Nausea & Vomiting: no nausea and no vomiting  Cardiovascular status: blood pressure returned to baseline  Respiratory status: acceptable  Hydration status: stable  Comments: Patient s/p epidural for L&D. Pt denies residual numbness post block. Patient is ambulating and voiding without difficulty. Patient denies back pain, headache, paresthesias, n/v or pruritus. Epidural site is free of signs of infection.

## 2019-03-19 ENCOUNTER — OFFICE VISIT (OUTPATIENT)
Dept: FAMILY MEDICINE CLINIC | Age: 29
End: 2019-03-19
Payer: COMMERCIAL

## 2019-03-19 VITALS
BODY MASS INDEX: 27.6 KG/M2 | DIASTOLIC BLOOD PRESSURE: 76 MMHG | HEART RATE: 104 BPM | OXYGEN SATURATION: 97 % | WEIGHT: 155.8 LBS | SYSTOLIC BLOOD PRESSURE: 112 MMHG | TEMPERATURE: 98.3 F

## 2019-03-19 DIAGNOSIS — A08.4 VIRAL GASTROENTERITIS: Primary | ICD-10-CM

## 2019-03-19 PROCEDURE — 87804 INFLUENZA ASSAY W/OPTIC: CPT | Performed by: FAMILY MEDICINE

## 2019-03-19 PROCEDURE — 99213 OFFICE O/P EST LOW 20 MIN: CPT | Performed by: FAMILY MEDICINE

## 2019-10-30 ENCOUNTER — HOSPITAL ENCOUNTER (OUTPATIENT)
Dept: ULTRASOUND IMAGING | Age: 29
Discharge: HOME | End: 2019-10-30
Payer: COMMERCIAL

## 2019-10-30 ENCOUNTER — HOSPITAL ENCOUNTER (OUTPATIENT)
Dept: WOMENS IMAGING | Age: 29
Discharge: HOME | End: 2019-10-30
Payer: COMMERCIAL

## 2019-10-30 DIAGNOSIS — N64.4 BREAST PAIN: ICD-10-CM

## 2019-10-30 DIAGNOSIS — L29.9 ITCHING: ICD-10-CM

## 2019-10-30 DIAGNOSIS — N64.4 NIPPLE PAIN: ICD-10-CM

## 2019-10-30 DIAGNOSIS — N64.4 BREAST PAIN, RIGHT: ICD-10-CM

## 2019-10-30 PROCEDURE — G0279 TOMOSYNTHESIS, MAMMO: HCPCS

## 2019-10-30 PROCEDURE — 76641 ULTRASOUND BREAST COMPLETE: CPT

## 2020-01-21 ENCOUNTER — OFFICE VISIT (OUTPATIENT)
Dept: FAMILY MEDICINE CLINIC | Age: 30
End: 2020-01-21
Payer: COMMERCIAL

## 2020-01-21 VITALS
WEIGHT: 157 LBS | DIASTOLIC BLOOD PRESSURE: 88 MMHG | TEMPERATURE: 98.7 F | BODY MASS INDEX: 27.81 KG/M2 | SYSTOLIC BLOOD PRESSURE: 120 MMHG | HEART RATE: 86 BPM | OXYGEN SATURATION: 98 %

## 2020-01-21 PROCEDURE — 99213 OFFICE O/P EST LOW 20 MIN: CPT | Performed by: FAMILY MEDICINE

## 2020-01-21 NOTE — PROGRESS NOTES
[address] Note    Date: 1/21/2020                                               Subjective/Objective:     Chief Complaint   Patient presents with    Fever    Generalized Body Aches    Chills       HPI   Fever to 102 starting 3 days ago. Went up to 104 yesterday. Got better today. +body aches and HA and chills. No cough. No SOB. Patient Active Problem List    Diagnosis Date Noted    Normal labor 11/18/2018    Vaginal delivery 11/18/2018    Postpartum hemorrhoids 05/15/2017    Constipation 05/15/2017    Abdominal weakness 05/15/2017       Past Medical History:   Diagnosis Date    Eczema     External hemorrhoids     Pregnancy induced hypertension     with 1st pregnancy, no problems with this pregnancy    Pyelonephritis 2011       Current Outpatient Medications   Medication Sig Dispense Refill    Prenatal MV-Min-Fe Fum-FA-DHA (PRENATAL 1 PO) Take 1 tablet by mouth      ibuprofen (ADVIL;MOTRIN) 800 MG tablet Take 1 tablet by mouth every 6 hours as needed for Pain 40 tablet 0     No current facility-administered medications for this visit. No Known Allergies    Review of Systems   No sore throat, no vomiting, no dysuria, no frequency    Vitals:  /88   Pulse 86   Temp 98.7 °F (37.1 °C)   Wt 157 lb (71.2 kg)   SpO2 98%   BMI 27.81 kg/m²     Physical Exam   General:  Well-appearing, NAD, alert, non-toxic  HEENT:  Normocephalic, atraumatic. Pupils equal and round. CHEST/LUNGS: CTAB, no crackles, no wheeze, no rhonchi. Symmetric rise  CARDIOVASCULAR: RRR,  no murmur, no rub  EXTREMETIES: Normal movement of all extremities  SKIN:  No rash, no cellulitis, no bruising, no petechiae/purpura/vesicles/pustules/abscess  PSYCH:  A+O x 3; normal affect  NEURO:  GCS 15, CN2-12 grossly intact, no focal motor/sensory deficits, no cerebellar deficits, normal gait, normal speech      Assessment/Plan     65-year-old female with fever, body aches. Likely viral syndrome. Recommend rest, fluids, expectant management. Discussed with patient medication/s dosage, instructions, potential S/E, A/R and Drug Interaction  Educational material provided regarding patient's condition  Tylenol or Motrin as needed for pain or fever  Advise to return here if worse or go to nearest ER  Encourage fluids  Pt discharged in stable condition at 16:51      1. Viral syndrome         No orders of the defined types were placed in this encounter. No follow-ups on file.     Grecia Zamora MD    1/21/2020  4:51 PM

## 2020-07-15 ENCOUNTER — EMPLOYEE WELLNESS (OUTPATIENT)
Dept: OTHER | Age: 30
End: 2020-07-15

## 2020-07-15 LAB
CHOLESTEROL, TOTAL: 165 MG/DL (ref 0–199)
GLUCOSE BLD-MCNC: 89 MG/DL (ref 70–99)
HDLC SERPL-MCNC: 53 MG/DL (ref 40–60)
LDL CHOLESTEROL CALCULATED: 98 MG/DL
TRIGL SERPL-MCNC: 68 MG/DL (ref 0–150)

## 2020-09-20 ENCOUNTER — NURSE TRIAGE (OUTPATIENT)
Dept: OTHER | Facility: CLINIC | Age: 30
End: 2020-09-20

## 2020-09-21 ENCOUNTER — PATIENT MESSAGE (OUTPATIENT)
Dept: FAMILY MEDICINE CLINIC | Age: 30
End: 2020-09-21

## 2020-09-21 ENCOUNTER — NURSE TRIAGE (OUTPATIENT)
Dept: OTHER | Facility: CLINIC | Age: 30
End: 2020-09-21

## 2020-09-21 ENCOUNTER — VIRTUAL VISIT (OUTPATIENT)
Dept: FAMILY MEDICINE CLINIC | Age: 30
End: 2020-09-21
Payer: COMMERCIAL

## 2020-09-21 PROBLEM — Z37.9 NORMAL LABOR: Status: RESOLVED | Noted: 2018-11-18 | Resolved: 2020-09-21

## 2020-09-21 PROBLEM — K59.00 CONSTIPATION: Status: RESOLVED | Noted: 2017-05-15 | Resolved: 2020-09-21

## 2020-09-21 PROCEDURE — 99213 OFFICE O/P EST LOW 20 MIN: CPT | Performed by: FAMILY MEDICINE

## 2020-09-21 ASSESSMENT — PATIENT HEALTH QUESTIONNAIRE - PHQ9
1. LITTLE INTEREST OR PLEASURE IN DOING THINGS: 0
SUM OF ALL RESPONSES TO PHQ QUESTIONS 1-9: 0
SUM OF ALL RESPONSES TO PHQ9 QUESTIONS 1 & 2: 0
2. FEELING DOWN, DEPRESSED OR HOPELESS: 0
SUM OF ALL RESPONSES TO PHQ QUESTIONS 1-9: 0

## 2020-09-21 NOTE — LETTER
[facility] [address] [facility]  Phone: [phone]  Fax: [phone]    Paula Ramirez MD        September 25, 2020     Patient: Serina Goldstein   YOB: 1990   Date of Visit: 9/21/2020       To Whom it May Concern:    Serina Goldstein has been tested for COVID and the results were negative. She is released to go back to work 9/25/2020, with no restrictions. If you have any questions or concerns, please don't hesitate to call.     Sincerely,     Paula Ramirez MD   Written by: Efrem Simon CMA

## 2020-09-21 NOTE — PROGRESS NOTES
tobacco: Never Used   Substance Use Topics    Alcohol use: No     Alcohol/week: 4.0 standard drinks     Types: 4 Standard drinks or equivalent per week    Drug use: No          PHYSICAL EXAMINATION:  Vital Signs: (As obtained by patient/caregiver or practitioner observation)  There were no vitals taken for this visit. Patient-Reported Vitals 9/21/2020   Patient-Reported Height 5 3   Patient-Reported Temperature 102        Respiratory rate appears normal      Constitutional: Appears well-developed and well-nourished. No apparent distress. She is ill appearing  Mental status: Alert and awake. Oriented to person/place/time. Able to follow commands    Eyes: EOM normal. Sclera normal. No discharge visible  HENT: Normocephalic, atraumatic. Mouth/Throat: Mucous membranes are moist. External Ears Normal    Neck: No visualized mass   Pulmonary/Chest: Respiratory effort normal.  No visualized signs of difficulty breathing or respiratory distress        Musculoskeletal:  Normal range of motion of neck  Neurological:       No Facial Asymmetry (Cranial nerve 7 motor function) (limited exam to video visit). No gaze palsy       Skin:  No significant exanthematous lesions or discoloration noted on facial skin       Psychiatric: Normal Affect. No Hallucinations            ASSESSMENT/PLAN:  1. Suspected COVID-19 virus infection  Will setup for testing  Quarantine recommendations discussed      No follow-ups on file. Riley Castaneda is a 31 y.o. female being evaluated by a Virtual Visit (video visit) encounter to address concerns as mentioned above. A caregiver was present when appropriate. Due to this being a TeleHealth encounter (During NIUOA-68 public health emergency), evaluation of the following organ systems was limited: Vitals/Constitutional/EENT/Resp/CV/GI//MS/Neuro/Skin/Heme-Lymph-Imm.   Pursuant to the emergency declaration under the [address] [facility] waiver authority and the Coronavirus Preparedness and Response Supplemental Appropriations Act, this Virtual Visit was conducted with patient's (and/or legal guardian's) consent, to reduce the patient's risk of exposure to COVID-19 and provide necessary medical care. The patient (and/or legal guardian) has also been advised to contact this office for worsening conditions or problems, and seek emergency medical treatment and/or call 911 if deemed necessary. Patient identification was verified at the start of the visit: Yes    Total time spent on this encounter: 15 minutes. Services were provided through a video synchronous discussion virtually to substitute for in-person clinic visit. Patient was located in their home. Provider was located in the office. --July Phillip MD on 9/21/2020 at 6:03 PM    An electronic signature was used to authenticate this note. Singh Fragoso

## 2020-09-21 NOTE — TELEPHONE ENCOUNTER
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 Green nasal drainage. Temp per ear 99.8. No cough, sob or other. Discussed triage vs covid intake form  Opted for triage    States kids are sick with colds    Triaged symptoms and advised to call back via [facility] line at any time  Care advice provided and instructed when to call back  Understanding verbalized    Reason for Disposition   Cold with no complications    Answer Assessment - Initial Assessment Questions  1. ONSET: \"When did the nasal discharge start? \"   Overnight last night  2. AMOUNT: \"How much discharge is there? \"    moderate clear nasal now green  3. COUGH: \"Do you have a cough? \" If yes, ask: \"Describe the color of your sputum\" (clear, white, yellow, green)    no  4. RESPIRATORY DISTRESS: \"Describe your breathing. \"   no  5. FEVER: \"Do you have a fever? \" If so, ask: \"What is your temperature, how was it measured, and when did it start? \"   99.8 ear  6. SEVERITY: \"Overall, how bad are you feeling right now? \" (e.g., doesn't interfere with normal activities, staying home from school/work, staying in bed)       Hard to rest, poor sleep, head cold  7. OTHER SYMPTOMS: \"Do you have any other symptoms? \" (e.g., sore throat, earache, wheezing, vomiting)     Ear pressure, sinus pressure  8. PREGNANCY: \"Is there any chance you are pregnant? \" \"When was your last menstrual period? \"   no    Protocols used: COMMON COLD-ADULT-

## 2020-09-21 NOTE — TELEPHONE ENCOUNTER
From: Tonie Miller  To: Cecile Garces MD  Sent: 9/21/2020 6:28 PM EDT  Subject: Visit Follow-Up Question    Dr Francisco Javier Toribio,     Are there any over the counter meds I should be taking? I have been taking ibuprofen and tried a couple of allergy and cold / sinus meds.      Thanks,     Tonie Miller

## 2020-09-21 NOTE — LETTER
[facility] [address] [facility]  Phone: [phone]  Fax: [phone]    Antolin Morel MD        September 21, 2020     Patient: Saúl Clay   YOB: 1990   Date of Visit: 9/21/2020       To Whom it May Concern:    Saúl Clay was seen via virtual visit on 9/21/2020. She is under suspicion for covid19 infection and will be tested on 9/22/2020. If you have any questions or concerns, please don't hesitate to call.     Sincerely,         Antolin Morel MD

## 2020-09-21 NOTE — TELEPHONE ENCOUNTER
Reason for Disposition   SEVERE sinus pain    Answer Assessment - Initial Assessment Questions  1. LOCATION: \"Where does it hurt? \"       Sinus pressure, ear pressure, headache  2. ONSET: \"When did the sinus pain start? \"  (e.g., hours, days)   Symptoms started saturday  3. SEVERITY: \"How bad is the pain? \"   (Scale 1-10; mild, moderate or severe)    - MILD (1-3): doesn't interfere with normal activities     - MODERATE (4-7): interferes with normal activities (e.g., work or school) or awakens from sleep    - SEVERE (8-10): excruciating pain and patient unable to do any normal activities      sinus pain this morning was 8/10  4. RECURRENT SYMPTOM: \"Have you ever had sinus problems before? \" If so, ask: \"When was the last time? \" and \"What happened that time?\"      5. NASAL CONGESTION: \"Is the nose blocked? \" If so, ask, \"Can you open it or must you breathe through the mouth? \"    6. NASAL DISCHARGE: \"Do you have discharge from your nose? \" If so ask, \"What color? \"  Nasal discharge is a green to yellow  7. FEVER: \"Do you have a fever? \" If so, ask: \"What is it, how was it measured, and when did it start? \"   No fever. Temp is 99.7  8. OTHER SYMPTOMS: \"Do you have any other symptoms? \" (e.g., sore throat, cough, earache, difficulty breathing)    Ear pain, headache, lsight sore throat. No sob, no chest pain. 9. PREGNANCY: \"Is there any chance you are pregnant? \" \"When was your last menstrual period? \"    Protocols used: SINUS PAIN AND CONGESTION-ADULT-[place]    Received call from [facility]. Pt calling with sinus pressure, headache, ear pain. Blowing out a green to yellow mucous. No fever, no chest pain, no sob. Recommend pt is seen today, call sooner if worsens. Call soft transferred to Laurier Landau in [address] to schedule appointment. Please do not reply to the triage nurse through this encounter. Any subsequent communication should be directly with the patient.

## 2020-09-22 ENCOUNTER — OFFICE VISIT (OUTPATIENT)
Dept: PRIMARY CARE CLINIC | Age: 30
End: 2020-09-22
Payer: COMMERCIAL

## 2020-09-22 PROCEDURE — 99211 OFF/OP EST MAY X REQ PHY/QHP: CPT | Performed by: NURSE PRACTITIONER

## 2020-09-22 NOTE — PATIENT INSTRUCTIONS

## 2020-09-22 NOTE — PROGRESS NOTES
Radha Rahman received a viral test for COVID-19. They were educated on isolation and quarantine as appropriate. For any symptoms, they were directed to seek care from their PCP, given contact information to establish with a doctor, directed to an urgent care or the emergency room.

## 2020-09-23 LAB — SARS-COV-2, NAA: NOT DETECTED

## 2020-09-24 NOTE — TELEPHONE ENCOUNTER
Send letter to patient in [employer] message. Note - release to go back to work 9/25/2020. Patient had negative COVID test and states that she has been symptom / fever free for >3 days w/o fever reducing medication.

## 2020-10-14 ENCOUNTER — TELEPHONE (OUTPATIENT)
Dept: FAMILY MEDICINE CLINIC | Age: 30
End: 2020-10-14

## 2020-10-14 ENCOUNTER — PATIENT MESSAGE (OUTPATIENT)
Dept: FAMILY MEDICINE CLINIC | Age: 30
End: 2020-10-14

## 2020-10-14 NOTE — TELEPHONE ENCOUNTER
Patient is scheduled for a Physical with Dr. Tessa Rocha 11/4/20.  Please place labs and [facility]

## 2020-10-19 VITALS — BODY MASS INDEX: 27.46 KG/M2 | WEIGHT: 155 LBS

## 2020-10-30 DIAGNOSIS — Z00.00 ANNUAL PHYSICAL EXAM: ICD-10-CM

## 2020-10-30 LAB
A/G RATIO: 1.7 (ref 1.1–2.2)
ALBUMIN SERPL-MCNC: 4.3 G/DL (ref 3.4–5)
ALP BLD-CCNC: 54 U/L (ref 40–129)
ALT SERPL-CCNC: 14 U/L (ref 10–40)
ANION GAP SERPL CALCULATED.3IONS-SCNC: 12 MMOL/L (ref 3–16)
AST SERPL-CCNC: 14 U/L (ref 15–37)
BASOPHILS ABSOLUTE: 0 K/UL (ref 0–0.2)
BASOPHILS RELATIVE PERCENT: 0.4 %
BILIRUB SERPL-MCNC: 0.4 MG/DL (ref 0–1)
BUN BLDV-MCNC: 13 MG/DL (ref 7–20)
CALCIUM SERPL-MCNC: 9 MG/DL (ref 8.3–10.6)
CHLORIDE BLD-SCNC: 101 MMOL/L (ref 99–110)
CHOLESTEROL, TOTAL: 140 MG/DL (ref 0–199)
CO2: 24 MMOL/L (ref 21–32)
CREAT SERPL-MCNC: 0.7 MG/DL (ref 0.6–1.1)
EOSINOPHILS ABSOLUTE: 0.2 K/UL (ref 0–0.6)
EOSINOPHILS RELATIVE PERCENT: 3 %
GFR AFRICAN AMERICAN: >60
GFR NON-AFRICAN AMERICAN: >60
GLOBULIN: 2.6 G/DL
GLUCOSE BLD-MCNC: 84 MG/DL (ref 70–99)
HCT VFR BLD CALC: 43.3 % (ref 36–48)
HDLC SERPL-MCNC: 48 MG/DL (ref 40–60)
HEMOGLOBIN: 14.2 G/DL (ref 12–16)
LDL CHOLESTEROL CALCULATED: 78 MG/DL
LYMPHOCYTES ABSOLUTE: 2 K/UL (ref 1–5.1)
LYMPHOCYTES RELATIVE PERCENT: 33.4 %
MCH RBC QN AUTO: 29.2 PG (ref 26–34)
MCHC RBC AUTO-ENTMCNC: 32.8 G/DL (ref 31–36)
MCV RBC AUTO: 88.9 FL (ref 80–100)
MONOCYTES ABSOLUTE: 0.6 K/UL (ref 0–1.3)
MONOCYTES RELATIVE PERCENT: 10.4 %
NEUTROPHILS ABSOLUTE: 3.2 K/UL (ref 1.7–7.7)
NEUTROPHILS RELATIVE PERCENT: 52.8 %
PDW BLD-RTO: 12.7 % (ref 12.4–15.4)
PLATELET # BLD: 244 K/UL (ref 135–450)
PMV BLD AUTO: 9.7 FL (ref 5–10.5)
POTASSIUM SERPL-SCNC: 4.3 MMOL/L (ref 3.5–5.1)
RBC # BLD: 4.87 M/UL (ref 4–5.2)
SODIUM BLD-SCNC: 137 MMOL/L (ref 136–145)
TOTAL PROTEIN: 6.9 G/DL (ref 6.4–8.2)
TRIGL SERPL-MCNC: 69 MG/DL (ref 0–150)
TSH REFLEX: 2.43 UIU/ML (ref 0.27–4.2)
VLDLC SERPL CALC-MCNC: 14 MG/DL
WBC # BLD: 6 K/UL (ref 4–11)

## 2020-11-30 ENCOUNTER — OFFICE VISIT (OUTPATIENT)
Dept: FAMILY MEDICINE CLINIC | Age: 30
End: 2020-11-30
Payer: COMMERCIAL

## 2020-11-30 VITALS
WEIGHT: 154 LBS | HEIGHT: 63 IN | SYSTOLIC BLOOD PRESSURE: 126 MMHG | OXYGEN SATURATION: 99 % | DIASTOLIC BLOOD PRESSURE: 78 MMHG | BODY MASS INDEX: 27.29 KG/M2 | HEART RATE: 77 BPM

## 2020-11-30 PROCEDURE — 99395 PREV VISIT EST AGE 18-39: CPT | Performed by: FAMILY MEDICINE

## 2020-11-30 NOTE — PROGRESS NOTES
Chief Complaint:   Marielena Burkett is a 31 y.o. female who presents for complete physical examination.     History of Present Illness:    No c/o today      Past Medical History:   Diagnosis Date    Eczema     External hemorrhoids     Pregnancy induced hypertension     with 1st pregnancy, no problems with this pregnancy    Pyelonephritis 2011       Past Surgical History:   Procedure Laterality Date    WISDOM TOOTH EXTRACTION         Outpatient Medications Marked as Taking for the 11/30/20 encounter (Office Visit) with Shayla Suárez MD   Medication Sig Dispense Refill    Prenatal MV-Min-Fe Fum-FA-DHA (PRENATAL 1 PO) Take 1 tablet by mouth      ibuprofen (ADVIL;MOTRIN) 800 MG tablet Take 1 tablet by mouth every 6 hours as needed for Pain 40 tablet 0     No Known Allergies    Social History     Socioeconomic History    Marital status:      Spouse name: Not on file    Number of children: Not on file    Years of education: Not on file    Highest education level: Not on file   Occupational History    Occupation: speech therapist   Social Needs    Financial resource strain: Not on file    Food insecurity     Worry: Not on file     Inability: Not on file    Transportation needs     Medical: Not on file     Non-medical: Not on file   Tobacco Use    Smoking status: Never Smoker    Smokeless tobacco: Never Used   Substance and Sexual Activity    Alcohol use: No     Alcohol/week: 4.0 standard drinks     Types: 4 Standard drinks or equivalent per week    Drug use: No    Sexual activity: Yes     Partners: Male     Birth control/protection: OCP   Lifestyle    Physical activity     Days per week: Not on file     Minutes per session: Not on file    Stress: Not on file   Relationships    Social connections     Talks on phone: Not on file     Gets together: Not on file     Attends Hindu service: Not on file     Active member of club or organization: Not on file     Attends meetings of clubs or organizations: Not on file     Relationship status: Not on file    Intimate partner violence     Fear of current or ex partner: Not on file     Emotionally abused: Not on file     Physically abused: Not on file     Forced sexual activity: Not on file   Other Topics Concern    Not on file   Social History Narrative    Not on file       Family History   Problem Relation Age of Onset    High Blood Pressure Mother     High Blood Pressure Father     High Blood Pressure Maternal Grandfather     Heart Disease Maternal Grandfather     Diabetes Maternal Grandfather     High Cholesterol Maternal Grandfather     Breast Cancer Paternal Grandmother     High Blood Pressure Paternal Grandfather     Cancer Maternal Aunt          Health Maintenance   Topic Date Due    Varicella vaccine (1 of 2 - 2-dose childhood series) 07/06/1990    Flu vaccine (1) 09/01/2020    Cervical cancer screen  10/18/2020    DTaP/Tdap/Td vaccine (3 - Td) 10/27/2026    Hib vaccine  Completed    HIV screen  Completed    Hepatitis A vaccine  Aged Out    Hepatitis B vaccine  Aged Out    Meningococcal (ACWY) vaccine  Aged Out    Pneumococcal 0-64 years Vaccine  Aged Out       Review Of Systems:  Skin: no changing moles, abnormal pigmentation, rash, scaling, itching, masses, hair or nail changes  Eyes: no blurring, diplopia, or eye pain  Ears/Nose/Throat: no hearing loss, tinnitus, vertigo, nosebleed, nasal congestion, rhinorrhea, sore throat  Respiratory: no cough, pleuritic chest pain, dyspnea, or wheezing  Cardiovascular: no angina, GARCIA, orthopnea, PND, palpitations, or claudication  Gastrointestinal: no nausea, vomiting, heartburn, diarrhea, constipation, bloating, or abdominal pain  Genitourinary: no urinary urgency, frequency, dysuria, nocturia, hesitancy, or incontinence  Musculoskeletal: no arthritis, arthralgia, myalgia, weakness, or morning stiffness  Neurologic: no paralysis, paresis, paresthesia, seizures, tremors, or headaches  Hematologic/Lymphatic/Immunologic: no anemia, abnormal bleeding/bruising, fever, chills, night sweats, swollen glands, or unexplained weight loss  Endocrine: no heat or cold intolerance and no polyphagia, polydipsia, or polyuria    PHYSICAL EXAMINATION:  /78 (Site: Right Upper Arm, Position: Sitting, Cuff Size: Medium Adult)   Pulse 77   Ht 5' 3\" (1.6 m)   Wt 154 lb (69.9 kg)   SpO2 99%   BMI 27.28 kg/m²   General appearance: healthy, alert, no distress  Skin: Skin color, texture, turgor normal. No rashes or suspicious lesions. No induration or tightening palpated. Head: Normocephalic. No masses, lesions, tenderness or abnormalities  Eyes: Conjunctivae/corneas clear. PERRL, EOM's intact. Ears: External ears normal. Canals clear. TM's clear bilaterally. Hearing grossly normal.  Nose/Sinuses: Nares normal.   Oropharynx: Lips, mucosa, and tongue normal. Teeth and gums normal. Oropharynx clear with no exudate seen. Neck: Neck supple, and symmetric. No adenopathy. Thyroid symmetric, normal size, without nodule. Trachea is midline. Back: Back symmetric, no curvature. ROM normal. No CVA tenderness. Lungs: Good diaphragmatic excursion. Lungs clear to auscultation bilaterally. No retractions or use of accessory muscles. Heart: PMI is not displaced, and no thrill noted. Regular rate and rhythm, with no rub, murmur or gallop noted. Breasts: Exam deferred to OB/GYN  Abdomen: Abdomen soft, non-tender. BS normal. No masses, organomegaly. No hernia noted. Extremities: Extremities normal. No deformities, edema, or skin discoloration. No cyanosis or clubbing noted to the nails. Hands and feet were warm and well-perfused with palpable dorsalis pedis pulses bilaterally. Lymph: No lymphadenopathy of the neck or supraclavicular regions. Musculoskeletal: Spine ROM normal. Muscular strength intact. Neuro: Cranial nerves intact, gait normal. No focal weakness.     Pelvic: Exam deferred to OB/GYN      ASSESSMENT/PLAN:  1. Annual physical exam  All health maintenance issues were updated.   Recommend continue current medications, continue current healthy lifestyle patterns and return for routine annual checkups

## 2020-12-30 ENCOUNTER — TELEPHONE (OUTPATIENT)
Dept: FAMILY MEDICINE CLINIC | Age: 30
End: 2020-12-30

## 2020-12-30 NOTE — TELEPHONE ENCOUNTER
----- Message from Erik Peres sent at 12/28/2020  5:31 PM EST -----  Subject: Message to Provider    QUESTIONS  Information for Provider? pt calling about an issue with her annual blood   work. said [employer] said it was not coded correctly for   preventative care. said she needs to speak to someone about issue  ---------------------------------------------------------------------------  --------------  CALL BACK INFO  What is the best way for the office to contact you? OK to leave message on   voicemail  Preferred Call Back Phone Number? [phone]  ---------------------------------------------------------------------------  --------------  SCRIPT ANSWERS  Relationship to Patient?  Self

## 2020-12-30 NOTE — TELEPHONE ENCOUNTER
bloodwork was signed with the DX code of annual physical, do you know if there is anything else I can do to have it covered by insurance?

## 2021-05-26 ENCOUNTER — SCHEDULED TELEPHONE ENCOUNTER (OUTPATIENT)
Dept: PHARMACY | Age: 31
End: 2021-05-26

## 2021-05-26 NOTE — TELEPHONE ENCOUNTER
Specialty Medication Service    Patient's Name: Mauricio Snell YOB: 1990      Mauricio Snell is a 31 y.o. female presenting today for Specialty Medication Service visit. Medication list updated. Specialty Medication: Dupixent   Frequency: 600 mg x 1 dose followed by 300 mg every 14 days thereafter  Indication: Atopic dermatitis  Initially Diagnosed: since childhood  Additional Therapy:   · Eucrisa and Elidel  Previous Therapy:   · Picato    Specialist: Kenyon Woodallesa  P: [phone]  Specialist Progress Note Available: No - will send fax for records  Last Specialist Visit: 5/5/21 - next OV on 6/3/21 to go over injections     No Known Allergies    Past Medical History:   Diagnosis Date    Eczema     External hemorrhoids     Pregnancy induced hypertension     with 1st pregnancy, no problems with this pregnancy    Pyelonephritis 2011      Social History     Tobacco Use    Smoking status: Never Smoker    Smokeless tobacco: Never Used   Substance Use Topics    Alcohol use: No     Alcohol/week: 4.0 standard drinks     Types: 4 Standard drinks or equivalent per week     Family History   Problem Relation Age of Onset    High Blood Pressure Mother     High Blood Pressure Father     High Blood Pressure Maternal Grandfather     Heart Disease Maternal Grandfather     Diabetes Maternal Grandfather     High Cholesterol Maternal Grandfather     Breast Cancer Paternal Grandmother     High Blood Pressure Paternal Grandfather     Cancer Maternal Aunt      Dermatitis/Eczema: Mauricio Snell is here for continued evaluation of skin condition related to speciality medication use. Patient reports skin rash located on hands, elbows, face, neck and hairline. Patient describes the rash as 7/10. Patient notes exposure to extreme temperatures and sweat. The patient has tried topical steroids for control of these symptoms.  These medications offer inadequate relief of symptoms. Patient did express concern at possibly needing to take this medication long term as this is not what she is looking for, but she agreeable to trying [email or web address] [address] [employer]. Medication delivery is pending copay card information - patient aware and plans to call [employer] to obtain [facility]    · Considering all the ways in which this condition and others affects you, how are you doing (0 = very well, 10 = very poorly)? 7.5  · During the past 4 weeks, have you missed any planned activities of daily living due to condition (work/school/other plans)? No  · During the past 4 weeks, have you had to seek urgent care, ER admission, Unplanned doctor office visit, or hospital admission? No    MEDICATIONS  Current Outpatient Medications   Medication Sig Dispense Refill    Crisaborole (EUCRISA) 2 % OINT Apply topically      pimecrolimus (ELIDEL) 1 % cream Apply topically 2 times daily Apply topically 2 times daily.  dapsone (ACZONE) 7.5 % GEL topical gel Apply topically daily       No current facility-administered medications for this visit. Current Specialty Medication: Dupilumab (Dupixent)   Indication Specific dose: 600 mg x 1 dose followed by 300 mg every 14 days thereafter. Warnings/Precautions: Hypersensitivity; Eosinophilia and vasculitis; Conjunctivitis and keratitis; Dupilumab antibodies, including neutralizing antibodies, may develop; Avoid the use of live vaccines   Recommended Monitoring: Annual PFTs (Asthma); Annual Eye exams  Storage: Store refrigerated at 36°F to 46°F (2°C to 8°C) in the original carton to protect from light. Do NOT freeze. Do NOT expose to heat. Do NOT shake. Handling: Pre-filled syringes may be kept at room temperature up to 77°F (25°C) for a maximum of 14 days. Do not expose to heat or direct sunlight. Prior to administration: Remove from refrigerator and allow to naturally warm up to room temperature before use.  Do not use beyond expiration date on label. Specialty Medication Start Date: 5/28/21 - has not been shipped yet (waiting for patient assistance information)  Appropriate Dose: Yes    Are you experiencing any side effects? N/A - has not yet started  Describe your medication adherence over the last 4 weeks: N/A - has not yet started  How many doses have you missed in the last 4 weeks, if any? N/A - has not yet started  How confident are you to follow the injection process and the treatment plan? (0-10) 10 - injection training to be completed at [facility] office    Drug Interactions:  No clinically significant interactions identified via 810   [employer] as category D or higher.  _____________________________________________________________________  Renal Dosing:  Creatinine Clearance: CrCl cannot be calculated (Patient's most recent lab result is older than the maximum 120 days allowed. ). No renal adjustments necessary.     LABS  Lab Results   Component Value Date    BUN 13 10/30/2020    CREATININE 0.7 10/30/2020     Lab Results   Component Value Date    WBC 6.0 10/30/2020    HGB 14.2 10/30/2020    HCT 43.3 10/30/2020    RBC 4.87 10/30/2020     10/30/2020     Lab Results   Component Value Date    ALKPHOS 54 10/30/2020    ALT 14 10/30/2020    AST 14 10/30/2020    BILITOT 0.4 10/30/2020     IMMUNIZATIONS  Immunization History   Administered Date(s) Administered    COVID-19, Moderna, PF, 100mcg/0.5mL 12/28/2020, 01/25/2021    HPV Quadrivalent (Gardasil) 07/31/2007, 10/27/2007, 12/14/2007    Hepatitis B 08/19/1996, 09/21/1996, 05/24/1997    Hib vaccine 11/19/1990    Influenza Vaccine, unspecified formulation 11/04/2016    Influenza Virus Vaccine 11/08/2014, 10/11/2017, 09/26/2018, 09/30/2018, 10/16/2019    MMR 11/19/1990, 08/19/1996    Meningococcal MPSV4 (Menomune) 07/31/2007    Polio OPV 1989, 1989, 01/03/1990, 02/22/1991, 08/12/1993    Tdap (Boostrix, Adacel) 02/12/2010, 10/27/2016 Immunization status: up to date and documented. ASSESSMENTS  Fall Risk 5/27/2021   2 or more falls in past year? no   Fall with injury in past year? no     PROMIS V1.1 Global Health 5/27/2021   In general, would you say your health is: 3   In general, would you say your quality of life is: 3   In general, how would you rate your physical health? 3   In general, how would you rate your mental health, including your mood and your ability to think? 3   In general, how would you rate your satisfaction with your social activities and relationships? 3   In general, please rate how well you carry out your usual social activities and roles. (This includes activities at home, at work and in your community, and responsibilities as a parent, child, spouse, employee, friend, etc.) 3   To what extent are you able to carry out your everyday physical activities such as walking, climbing stairs, carrying groceries, or moving a chair? 4   In the past 7 days how often have you been bothered by emotional problems such as feeling anxious, depressed or irritable? 3   In the past 7 days how would you rate your fatigue on average? 4   In the past 7 days how would you rate your pain on average? 0   Mode of Collection 1   Person Completing Survey 0   PROMIS Physical Score 11   PROMIS Mental Score 12     1. Dermatitis/Eczema   Meaghan Robertson is a 31 y.o. female being treated for Dermatitis.  Medication reconciliation completed, no drug-drug interactions identified. Allergy and diagnosis info reviewed and updated. Meaghan Robertson has a history remarkable for the following conditions: N/A   The patient has previously been treated with topical steroids. Current therapy includes: Eucrisa and Elidel with the initiation of Dupixent 600 mg x 1 dose, then 300 mg every 14 days   Warnings, precautions, and contraindications reviewed with the patient.  Also reviewed storage, administration, and proper disposal.   Current disease state symptoms include: Dermatitis is affecting hands, elbows, face, and hairline   Medication Effectiveness: Patient disease is not well controlled on current therapy.  No side effects/adverse events reported, and no adherence issues identified as patient has yet to start medication.  Reviewed copay and advised patient that they will receive a copy of the patient rights and responsibility document with their welcome packet in the mail.  Patient is not considered high risk. Based on patient feedback/results of the assessment, the therapy is still appropriate.  No medication-related problem(s) or patient need(s) identified that would require a care plan. Follow up in 90 days     2. Immunization  Immunization status: up to date and documented. 3. Drug Interaction  N/A    4. Other Identified Potential Issues  Copay assistance - patient has been in contact with [employer], but did not receive all information necessary for [employer]. Patient plans to call [employer] today to obtain all info to pass along to pharmacy. Lasik - Patient had eye surgery in July 2020. Discussed potential ocular side effects with patient and advised her to continue to schedule annual eye exam if possible. PLAN  Goals of therapy, common side effects, medication storage, and administration reviewed with patient. Patient requested to receive non-PHI related information (ex. Links to websites, blank PAP applications, educational materials, etc.) via email address provided by the patient and documented in the chart. This is in lieu of handing out documents during direct patient care visits due to COVID-19 precautions and the use of remote/virtual visits. Will send CPA consent form and FAQ handout to [email or web address]    continue Dupixent 600 mg x 1 dose, then 300 mg every 14 days thereafter.  Patient needs copay assistance card on file with [employer]    Recommended monitoring to complete: Eye exam recommended annually    Keep all scheduled appointments. Return to clinic in 3 month(s). or call with any questions or concerns.      For Pharmacy [address] in place:  No   Recommendation Provided To: Patient/Caregiver: 1 via Telephone   Intervention Detail: Adherence Monitoring: N/A and Patient Access Assistance/Sample Provided   Total # of Interventions Recommended: 1   Total # of Interventions Accepted: 1   Intervention Accepted By: Patient/Caregiver: 1   Time Spent (min): 80

## 2021-05-27 RX ORDER — PIMECROLIMUS 10 MG/G
CREAM TOPICAL 2 TIMES DAILY
COMMUNITY
End: 2022-07-07

## 2021-05-27 RX ORDER — DUPILUMAB 300 MG/2ML
INJECTION, SOLUTION SUBCUTANEOUS
COMMUNITY
End: 2022-01-24 | Stop reason: SDUPTHER

## 2021-05-27 RX ORDER — DAPSONE 75 MG/G
GEL TOPICAL DAILY
COMMUNITY
End: 2022-07-07

## 2021-05-27 RX ORDER — CRISABOROLE 20 MG/G
OINTMENT TOPICAL
COMMUNITY
End: 2022-07-07

## 2021-05-27 ASSESSMENT — PROMIS GLOBAL HEALTH SCALE
WHO IS THE PERSON COMPLETING THE PROMIS V1.1 SURVEY?: 0
IN GENERAL, HOW WOULD YOU RATE YOUR PHYSICAL HEALTH [ON A SCALE OF 1 (POOR) TO 5 (EXCELLENT)]?: 3
IN GENERAL, WOULD YOU SAY YOUR HEALTH IS...[ON A SCALE OF 1 (POOR) TO 5 (EXCELLENT)]: 3
IN THE PAST 7 DAYS, HOW WOULD YOU RATE YOUR FATIGUE ON AVERAGE [ON A SCALE FROM 1 (NONE) TO 5 (VERY SEVERE)]?: 4
IN GENERAL, HOW WOULD YOU RATE YOUR SATISFACTION WITH YOUR SOCIAL ACTIVITIES AND RELATIONSHIPS [ON A SCALE OF 1 (POOR) TO 5 (EXCELLENT)]?: 3
IN GENERAL, PLEASE RATE HOW WELL YOU CARRY OUT YOUR USUAL SOCIAL ACTIVITIES (INCLUDES ACTIVITIES AT HOME, AT WORK, AND IN YOUR COMMUNITY, AND RESPONSIBILITIES AS A PARENT, CHILD, SPOUSE, EMPLOYEE, FRIEND, ETC) [ON A SCALE OF 1 (POOR) TO 5 (EXCELLENT)]?: 3
SUM OF RESPONSES TO QUESTIONS 2, 4, 5, & 10: 12
IN THE PAST 7 DAYS, HOW OFTEN HAVE YOU BEEN BOTHERED BY EMOTIONAL PROBLEMS, SUCH AS FEELING ANXIOUS, DEPRESSED, OR IRRITABLE [ON A SCALE FROM 1 (NEVER) TO 5 (ALWAYS)]?: 3

## 2021-05-27 ASSESSMENT — PATIENT HEALTH QUESTIONNAIRE - PHQ9
SUM OF ALL RESPONSES TO PHQ QUESTIONS 1-9: 0
2. FEELING DOWN, DEPRESSED OR HOPELESS: 0
SUM OF ALL RESPONSES TO PHQ QUESTIONS 1-9: 0
1. LITTLE INTEREST OR PLEASURE IN DOING THINGS: 0

## 2021-05-31 VITALS — BODY MASS INDEX: 33.79 KG/M2 | WEIGHT: 179 LBS | HEIGHT: 61 IN

## 2021-06-04 NOTE — TELEPHONE ENCOUNTER
I have reviewed this patient case with the [facility] pharmacist and agree with the assessment/plan. Will sign off at this time.     Melodie Hameed MD  Clinic Medical Director  Specialty Medication Service

## 2021-06-12 NOTE — ANESTHESIA POSTPROCEDURE EVALUATION
Patient: Velma Mejia  LAPAROSCOPY WITH A TUBAL DYE STUDY FULGURATION OF ENDOMETRIOSIS CONVERTED TO LAPAROTOMY BILATERAL OVARIAN BIOPSIES,  LAPAROTOMY  Anesthesia type: general    Patient location: PACU  Last vitals:   Vitals:    07/28/17 1408   BP: 115/61   Pulse: 89   Resp: 16   Temp: 36.5  C (97.7  F)   SpO2: 99%     Post vital signs: stable  Level of consciousness: awake and responds to simple questions  Post-anesthesia pain: pain controlled  Post-anesthesia nausea and vomiting: no  Pulmonary: unassisted, return to baseline  Cardiovascular: stable and blood pressure at baseline  Hydration: adequate  Anesthetic events: no    QCDR Measures:  ASA# 11 - Sruthi-op Cardiac Arrest: ASA11B - Patient did NOT experience unanticipated cardiac arrest  ASA# 12 - Sruthi-op Mortality Rate: ASA12B - Patient did NOT die  ASA# 13 - PACU Re-Intubation Rate: ASA13B - Patient did NOT require a new airway mgmt  ASA# 10 - Composite Anes Safety: ASA10A - No serious adverse event  ASA# 38 - New Corneal Injury: ASA38A - No new exposure keratitis or corneal abrasion in PACU       Additional Notes:

## 2021-06-12 NOTE — ANESTHESIA PREPROCEDURE EVALUATION
Anesthesia Evaluation      Patient summary reviewed     Airway   Mallampati: II  Neck ROM: full   Pulmonary - negative ROS and normal exam                          Cardiovascular - negative ROS and normal exam   Neuro/Psych - negative ROS     Endo/Other - negative ROS      GI/Hepatic/Renal - negative ROS      Other findings: Abnormal bleeding due to PCOS.   Hb  13.3      Dental                         Anesthesia Plan  Planned anesthetic: general endotracheal    ASA 2   Induction: intravenous   Anesthetic plan and risks discussed with: patient    Post-op plan: routine recovery

## 2021-06-12 NOTE — ANESTHESIA CARE TRANSFER NOTE
Last vitals:   Vitals:    07/28/17 1030   BP: 138/87   Pulse: 97   Resp: 22   Temp: 37  C (98.6  F)   SpO2: 100%     Patient's level of consciousness is drowsy  Spontaneous respirations: yes  Maintains airway independently: yes  Dentition unchanged: yes  Oropharynx: oropharynx clear of all foreign objects    QCDR Measures:  ASA# 20 - Surgical Safety Checklist: WHO surgical safety checklist completed prior to induction  PQRS# 430 - Adult PONV Prevention: 4558F - Pt received => 2 anti-emetic agents (different classes) preop & intraop  ASA# 8 - Peds PONV Prevention: NA - Not pediatric patient, not GA or 2 or more risk factors NOT present  PQRS# 424 - Sruthi-op Temp Management: 4559F - At least one body temp DOCUMENTED => 35.5C or 95.9F within required timeframe  PQRS# 426 - PACU Transfer Protocol: - Transfer of care checklist used  ASA# 14 - Acute Post-op Pain: ASA14B - Patient did NOT experience pain >= 7 out of 10

## 2021-06-16 PROBLEM — Z98.890 S/P LAPAROTOMY: Status: ACTIVE | Noted: 2017-07-28

## 2021-08-06 LAB
CHOLESTEROL, TOTAL: 172 MG/DL (ref 0–199)
GLUCOSE BLD-MCNC: 67 MG/DL (ref 70–99)
HDLC SERPL-MCNC: 54 MG/DL (ref 40–60)
LDL CHOLESTEROL CALCULATED: 108 MG/DL
TRIGL SERPL-MCNC: 49 MG/DL (ref 0–150)

## 2021-08-13 PROBLEM — R06.2 WHEEZING: Status: ACTIVE | Noted: 2021-08-13

## 2022-01-24 ENCOUNTER — TELEPHONE (OUTPATIENT)
Dept: PHARMACY | Age: 32
End: 2022-01-24

## 2022-01-24 DIAGNOSIS — Z79.899 MEDICATION MANAGEMENT: Primary | ICD-10-CM

## 2022-01-24 NOTE — TELEPHONE ENCOUNTER
Specialty Medication Service    Patient's Name: Darby Mejia YOB: 1990      Reason for visit: Darby Mejia is a 27 y.o. female presenting today for Specialty Medication Service visit follow up. Patient last seen by [facility] 01/24/2022. Patient continues on [facility] formulary medication, Dupixent. Pharmacy completed Specialty Medication Service visit for medication monitoring and counseling. Medication list updated. Specialty Medication: Dupixent   Frequency: 300 mg every two weeks starting on day 15  Indication: Atopic dermatitis  Initially Diagnosed: since childhood  Additional Therapy:   · Eucrisa and Elidel  Previous Therapy:   · Picato     Specialist: Kenyon Daleyesa  P: [phone]  Specialist Progress Note Available: No   Last Specialist Visit: 12/2021- Patient reported seeing specialist in December. Will have records faxed.      No Known Allergies    Past Medical History:   Diagnosis Date    Eczema     External hemorrhoids     Pregnancy induced hypertension     with 1st pregnancy, no problems with this pregnancy    Pyelonephritis 2011      Social History     Tobacco Use    Smoking status: Never Smoker    Smokeless tobacco: Never Used   Substance Use Topics    Alcohol use: No     Alcohol/week: 4.0 standard drinks     Types: 4 Standard drinks or equivalent per week     Family History   Problem Relation Age of Onset    High Blood Pressure Mother     High Blood Pressure Father     High Blood Pressure Maternal Grandfather     Heart Disease Maternal Grandfather     Diabetes Maternal Grandfather     High Cholesterol Maternal Grandfather     Breast Cancer Paternal Grandmother     High Blood Pressure Paternal Grandfather     Cancer Maternal Aunt        INTERM HISTORY  Have you been diagnosed with any additional conditions since we last talked? no  Have you developed any new allergies since we last talked? no  Have you stopped taking any medications or supplements since we last talked? no  Have you started taking any additional medications or supplements since we last talked? no    REVIEW OF CURRENT DISEASE STATE  Dermatitis/Eczema: Karli Cyr is here for continued evaluation of skin condition related to speciality medication use. Patient reports skin rash located on right face, right neck and right hands. Patient describes the rash as red, raised and itchy (but better since starting new cream- patient can't recall the name of medication- will obtain records from specialist). The rash had been occurring/present for 6 weeks and has been rapidly improving since starting new cream.  The patient has tried OTC lotions for control of these symptoms. These medications offer unsatisfactory relief of symptoms. · Number of atopic dermatitis flares in the last month? 0  · Considering all the ways in which illness and health conditions may affect you at this time, please indicate below how you are doing: (0 = very well, 10 = very poorly)? 10  · How would you rate your pain on average? (0 = no pain, 10 = worst pain imaginable) 0  · During the past 4 weeks, have you missed any activities of daily living (work/school)? No  · During the past 4 weeks, have you had to seek urgent care? No    MEDICATIONS  Current Outpatient Medications   Medication Sig Dispense Refill    Crisaborole (EUCRISA) 2 % OINT Apply topically      pimecrolimus (ELIDEL) 1 % cream Apply topically 2 times daily Apply topically 2 times daily.  dapsone (ACZONE) 7.5 % GEL topical gel Apply topically daily      dupilumab (DUPIXENT) 300 MG/2ML SOSY injection Inject 600 mg into the skin x 1 dose then inject 300 mg into the skin every 14 days thereafter       No current facility-administered medications for this visit. Current Specialty Medication:   Dupilumab (Dupixent)   Indication Specific dose:    Atopic Dermatitis: 600 mg then 300 mg every 2 weeks  Warnings/Precautions: Hypersensitivity; Eosinophilia and vasculitis; Conjunctivitis and keratitis; Dupilumab antibodies, including neutralizing antibodies, may develop; Avoid the use of live vaccines   Recommended Monitoring: Annual PFTs (Asthma); Annual Eye exams  Storage: Store refrigerated at 36°F to 46°F (2°C to 8°C) in the original carton to protect from light. Do NOT freeze. Do NOT expose to heat. Do NOT shake. Handling: Pre-filled syringes may be kept at room temperature up to 77°F (25°C) for a maximum of 14 days. Do not expose to heat or direct sunlight. Prior to administration: Remove from refrigerator and allow to naturally warm up to room temperature before use. Do not use beyond expiration date on label. Patient Reported Side Effects: none  Specialty Medication Start Date: 05/2021  Appropriate Dose: Yes    Describe your medication adherence over the last 4 weeks: Good (except for missing 1 dose)  How many doses have you missed in the last 4 weeks, if any? 1  How confident are you to follow the injection process and the treatment plan? (0-10) 10 Who injects? self  Does the patient have a current infection of any kind? No    Contraindications to therapy present? No    Drug Interactions:  No clinically significant interactions identified via [employer] Interaction Analysis as category D or higher.  _____________________________________________________________________  Renal Dosing:  Creatinine Clearance: CrCl cannot be calculated (Patient's most recent lab result is older than the maximum 120 days allowed. ). No renal adjustments necessary.     LABS  Lab Results   Component Value Date    BUN 13 10/30/2020    CREATININE 0.7 10/30/2020     Lab Results   Component Value Date    WBC 6.0 10/30/2020    HGB 14.2 10/30/2020    HCT 43.3 10/30/2020    RBC 4.87 10/30/2020     10/30/2020     Lab Results   Component Value Date    ALKPHOS 54 10/30/2020    ALT 14 10/30/2020    AST 14 10/30/2020    BILITOT 0.4 10/30/2020 IMMUNIZATIONS  Immunization History   Administered Date(s) Administered    BASSAM-Mary, Karen Souza, Primary or Immunocompromised, PF, 100mcg/0.5mL 12/28/2020, 01/25/2021    HPV Quadrivalent (Gardasil) 07/31/2007, 10/27/2007, 12/14/2007    Hepatitis B 08/19/1996, 09/21/1996, 05/24/1997    Hib vaccine 11/19/1990    Influenza Vaccine, unspecified formulation 11/04/2016    Influenza Virus Vaccine 11/08/2014, 11/04/2016, 10/11/2017, 09/26/2018, 09/30/2018, 10/16/2019    MMR 11/19/1990, 08/19/1996    Meningococcal MPSV4 (Menomune) 07/31/2007    Polio OPV 1989, 1989, 01/03/1990, 02/22/1991, 08/12/1993    Tdap (Boostrix, Adacel) 02/12/2010, 10/27/2016      Immunization status: up to date and documented. ASSESSMENTS  Fall Risk 5/27/2021   2 or more falls in past year? no   Fall with injury in past year? no     PROMIS V1.1 Global Health 5/27/2021   In general, would you say your health is: 3   In general, would you say your quality of life is: 3   In general, how would you rate your physical health? 3   In general, how would you rate your mental health, including your mood and your ability to think? 3   In general, how would you rate your satisfaction with your social activities and relationships? 3   In general, please rate how well you carry out your usual social activities and roles. (This includes activities at home, at work and in your community, and responsibilities as a parent, child, spouse, employee, friend, etc.) 3   To what extent are you able to carry out your everyday physical activities such as walking, climbing stairs, carrying groceries, or moving a chair? 4   In the past 7 days how often have you been bothered by emotional problems such as feeling anxious, depressed or irritable? 3   In the past 7 days how would you rate your fatigue on average? 4   In the past 7 days how would you rate your pain on average?  0   Mode of Collection 1   Person Completing Survey 0   PROMIS Physical Score 11   PROMIS Mental Score 12       1. Dermatitis/Eczema   Stephanie Bee is a 27 y.o. female being treated for Atopic Dermatitis   Medication Reconciliation completed, no drug-drug interactions identified. Allergy and diagnosis info reviewed and updated. Stephanie Bee has a history remarkable for the following conditions: NA  · Current therapy includes: Eucrisa + Elidel (PRN) + Aczone (PRN)   Medication Effectiveness: Patient disease is  well controlled on current therapy.  Patient had no questions regarding the medication's warnings, precautions, and contraindications. Confirmed appropriate storage and disposal.   Patient had no concerns with the administration process. · Current disease state symptoms include:  Dermatitis is affecting hands, face, inside of arms, neck   No side effects/adverse events reported, and no adherence issues identified.  Patient is not considered high risk. Based on patient feedback/results of the assessment, the therapy is  still appropriate.  No medication-related problem(s) or patient need(s) identified that would require a care plan. Follow up in 180 days     2. Immunization   Immunization status: up to date and documented. Missing Covid booster, flu vaccine     3. Drug Interaction   None at this time        PLAN  · Goals of therapy, common side effects, medication storage, and administration reviewed with patient. · continue Dupixent 300mg every two weeks starting on day 15  · Recommended monitoring to complete: Complete annual eyes exam   · Recommended immunizations: Covid booster, flu vaccine  Keep all scheduled appointments. Return to clinic in 6 month(s). or call with any questions or concerns. Discussed with patient the Pharmacist Collaborative Practice Agreement. Patient provided verbal and/or electronic (ex. [email or web address]) consent to participate in the collaborative practice agreement between the pharmacist and referred patient.  This is in lieu of paper consent due to COVID-19 precautions and the use of remote/virtual visits.      Jessica Prince, [facility], PharmD, [address]  Ambulatory Clinical Pharmacist   2022 11:17 AM      For Pharmacy Admin Tracking Only     CPA in place:  Yes- (received verbal agreement 22)   Recommendation Provided To: Provider: 2 via Note to Provider   Intervention Detail: Adherence Monitorin and Vaccine Recommended/Administered   Intervention Accepted By: Provider: 2   Time Spent (min): 60

## 2022-01-25 ENCOUNTER — OFFICE VISIT (OUTPATIENT)
Dept: PHARMACY | Age: 32
End: 2022-01-25

## 2022-01-25 DIAGNOSIS — L20.9 ATOPIC DERMATITIS, UNSPECIFIED TYPE: Primary | ICD-10-CM

## 2022-01-25 RX ORDER — DUPILUMAB 300 MG/2ML
300 INJECTION, SOLUTION SUBCUTANEOUS
Qty: 2 ML | Refills: 2 | Status: SHIPPED | OUTPATIENT
Start: 2022-01-25 | End: 2022-01-26 | Stop reason: SDUPTHER

## 2022-01-25 NOTE — PROGRESS NOTES
I called the patient to inform her that I was the medical director for med management program.    She is on [address] for atopic dermatitis. She is taking it for 9 months. She says the medications will COVID well. She denies any side effects of medication. She did tell me that she had missed 1 dose. I told her that typically with the present events within 7 days she could take dose and go to her original schedule. It was more than 7 days she had missed a dose and just stick with original schedule. I did tell her to call her dermatologist.    I told her that her dermatologist and PCP would be primarily managing her illness. She verbalized understanding.       Alisa Hernández MD 1/25/2022 3:35 PM

## 2022-01-26 DIAGNOSIS — L20.9 ATOPIC DERMATITIS, UNSPECIFIED TYPE: ICD-10-CM

## 2022-01-26 RX ORDER — DUPILUMAB 300 MG/2ML
300 INJECTION, SOLUTION SUBCUTANEOUS
Qty: 4 ML | Refills: 2 | Status: SHIPPED | OUTPATIENT
Start: 2022-01-26 | End: 2022-05-16 | Stop reason: SDUPTHER

## 2022-01-26 NOTE — TELEPHONE ENCOUNTER
Pharmacy requesting an updated script for [address]. Sig should read Inject 300 mg into the skin every 2 weeks. Script provided is for loading dose. Patient has a HX of using this medication.      Requested Prescriptions     Pending Prescriptions Disp Refills    dupilumab (DUPIXENT) 300 MG/2ML SOSY injection 4 mL 2     Sig: Inject 2 mLs into the skin every 14 days

## 2022-05-13 DIAGNOSIS — L20.9 ATOPIC DERMATITIS, UNSPECIFIED TYPE: ICD-10-CM

## 2022-05-13 RX ORDER — DUPILUMAB 300 MG/2ML
300 INJECTION, SOLUTION SUBCUTANEOUS
Qty: 4 ML | Refills: 11 | Status: CANCELLED | OUTPATIENT
Start: 2022-05-13

## 2022-05-13 NOTE — TELEPHONE ENCOUNTER
CLINICAL PHARMACY NOTE - SPECIALTY MEDICATION SERVICE     Funmi Crabtree is a 27 y.o. female enrolled in the Specialty Medication Service. We received a refill request for Dupixent on 5/13/2022. Patient has a signed CPA on file dated 1/24/2022 . Patient last met with [facility] on 1/25/2022 and is due for follow up on 7/4/2022. Follow up visit is not scheduled at this time. Patient's last visit with specialist is unknown. Office notes requested. Original Rx was written for 2+11 fills on 4/7/2022.      Thank you,    Gia Marrero    Requested Prescriptions     Pending Prescriptions Disp Refills    dupilumab (DUPIXENT) 300 MG/2ML SOSY injection 4 mL 11     Sig: Inject 2 mLs into the skin every 14 days

## 2022-05-16 DIAGNOSIS — L20.9 ATOPIC DERMATITIS, UNSPECIFIED TYPE: Primary | ICD-10-CM

## 2022-05-16 RX ORDER — DUPILUMAB 300 MG/2ML
300 INJECTION, SOLUTION SUBCUTANEOUS
Qty: 4 ML | Refills: 11 | Status: SHIPPED | OUTPATIENT
Start: 2022-05-16 | End: 2022-07-07 | Stop reason: SDUPTHER

## 2022-05-16 NOTE — TELEPHONE ENCOUNTER
Per CPA, [facility] script for dupixent 4 + 11 refills was sent to [facility]. No further outreach needed at this time.     Darby Park [facility], PharmD, [address]  Ambulatory Clinical Pharmacist   5/16/2022 8:34 AM

## 2022-07-05 ENCOUNTER — TELEPHONE (OUTPATIENT)
Dept: INTERNAL MEDICINE | Age: 32
End: 2022-07-05

## 2022-07-05 NOTE — LETTER
[address] [facility],[address]  [address], [place] [address]            22       Dr. Carleen Schulz,     I am a pharmacist with the Specialty Medication Service offered through [facility] ([facility]) and we have mutual patient: Edgardo Miranda ( 1990). My last telephone appointment with the patient was on 2022 to discuss their [address]. The patient reports she was last evaluated in December. If the patient has a signed Release of Information (THANG) form on file, please fax any office visit notes to our office at [phone] so we can add it to the patients chart with [facility] ([facility]).            Thank you in advance,       Kimo Grijalva, PharmD  Ambulatory Clinical Pharmacist  Specialty Medication Service  Telephone: [phone] option 4   Fax: [phone]  Email: [email or web address]

## 2022-07-06 LAB
CHOLESTEROL, TOTAL: 170 MG/DL (ref 0–199)
GLUCOSE BLD-MCNC: 83 MG/DL (ref 70–99)
HDLC SERPL-MCNC: 67 MG/DL (ref 40–60)
LDL CHOLESTEROL CALCULATED: 87 MG/DL
TRIGL SERPL-MCNC: 79 MG/DL (ref 0–150)

## 2022-07-06 NOTE — PROGRESS NOTES
Family History   Problem Relation Age of Onset    High Blood Pressure Mother     High Blood Pressure Father     High Blood Pressure Maternal Grandfather     Heart Disease Maternal Grandfather     Diabetes Maternal Grandfather     High Cholesterol Maternal Grandfather     Breast Cancer Paternal Grandmother     High Blood Pressure Paternal Grandfather     Cancer Maternal Aunt        INTERM HISTORY  Have you been diagnosed with any additional conditions since we last talked? no  Have you developed any new allergies since we last talked? no  Have you stopped taking any medications or supplements since we last talked? yes, Eucrisa, Aczone, Elidel  Have you started taking any additional medications or supplements since we last talked? yes, Opzelura    REVIEW OF CURRENT DISEASE STATE  Dermatitis/Eczema: Olegario Khoury is here for continued evaluation of skin condition related to speciality medication use. Overall she is doing very well; states that he is currently 100% clear of all areas of dermatitis. Her last flare was 2 to 3 months ago and was mild. Patient notes exposure to no new potential antigens. Patient denies exposure to no new potential antigens. The patient has tried OTC lotions for control of these symptoms. These medications offer unsatisfactory relief of symptoms. · Considering all the ways in which illness and health conditions may affect you at this time, please indicate below how you are doing: (0 = very well, 10 = very poorly)? 0  · How would you rate your pain on average? (0 = no pain, 10 = worst pain imaginable) 0  · During the past 4 weeks, have you missed any activities of daily living (work/school)? No  · During the past 4 weeks, have you had to seek urgent care? No    Over the last 2 weeks, how often have you been bothered by the following problems?   1) Little interest or pleasure in doing things: Not at all   2) Feeling down, depressed, or hopeless: Not at all    MEDICATIONS  Current Outpatient Medications   Medication Sig Dispense Refill    Ruxolitinib Phosphate (OPZELURA) 1.5 % CREA Apply topically daily as needed      dupilumab (DUPIXENT) 300 MG/2ML SOSY injection Inject 2 mLs into the skin every 14 days 4 mL 11     No current facility-administered medications for this visit. Current Specialty Medication:  Dupilumab (Dupixent)   Indication Specific dose: Atopic Dermatitis: 600 mg then 300 mg every 2 weeks  Warnings/Precautions: Hypersensitivity; Eosinophilia and vasculitis; Conjunctivitis and keratitis; Dupilumab antibodies, including neutralizing antibodies, may develop; Avoid the use of live vaccines   Recommended Monitoring: Annual PFTs (Asthma); Annual Eye exams  Storage: Store refrigerated at 36°F to 46°F (2°C to 8°C) in the original carton to protect from light. Do NOT freeze. Do NOT expose to heat. Do NOT shake. Handling: Pre-filled syringes may be kept at room temperature up to 77°F (25°C) for a maximum of 14 days. Do not expose to heat or direct sunlight. Prior to administration: Remove from refrigerator and allow to naturally warm up to room temperature before use. Do not use beyond expiration date on label. Patient Reported Side Effects: none  Specialty Medication Start Date: 5/2021  Appropriate Dose: Yes  Eye exam: Last eye exam about 2 years per patient  Pregnant: No      Describe your medication adherence over the last 4 weeks: Excellent  How many doses have you missed in the last 4 weeks, if any? 0  How confident are you to follow the injection process and the treatment plan? (0-10) 9 Who injects? Self  Does the patient have a current infection of any kind? No    Contraindications to therapy present?  No    Drug Interactions:  No clinically significant interactions identified via [employer] Interaction Analysis as category D or higher.  _____________________________________________________________________  Renal Dosing:  Creatinine general, how would you rate your satisfaction with your social activities and relationships? 3   In general, please rate how well you carry out your usual social activities and roles. (This includes activities at home, at work and in your community, and responsibilities as a parent, child, spouse, employee, friend, etc.) 3   To what extent are you able to carry out your everyday physical activities such as walking, climbing stairs, carrying groceries, or moving a chair? 4   In the past 7 days how often have you been bothered by emotional problems such as feeling anxious, depressed or irritable? 3   In the past 7 days how would you rate your fatigue on average? 4   In the past 7 days how would you rate your pain on average? 0   Mode of Collection 1   Person Completing Survey 0   PROMIS Physical Score 11   PROMIS Mental Score 12       1. Dermatitis/Eczema   Regina Erickson is a 34 y.o. female being treated for Atopic Dermatitis   Medication Reconciliation completed, no drug-drug interactions identified. Allergy and diagnosis info reviewed and updated. Regina Erickson has a history remarkable for the following conditions: Atopic dermatitis   Current therapy includes: Dupixent, Opzelura cream   Medication Effectiveness: Patient disease is  well controlled on current therapy.  Patient had no questions regarding the medication's warnings, precautions, and contraindications. Confirmed appropriate storage and disposal.   Patient had no concerns with the administration process.  Current disease state symptoms include: None, no areas of dermatitis currently   No side effects/adverse events reported, and no adherence issues identified.  Functional and cognitive limitations include: None   Patient is not considered high risk. Based on patient feedback/results of the assessment, the therapy is  still appropriate.  No medication-related problem(s) or patient need(s) identified that would require a care plan. Follow up in 180 days     2. Immunization   Immunization status: missing doses of [id number]. Patient willing to obtain. Follow-up next [facility] visit. 3. Drug Interaction   No clinically significant interactions identified via [employer] Interaction Analysis as category D or higher. 4. Other Identified Potential Issues  Eye exam: patient is due for an eye exam, she will schedule an appointment. Follow-up next [facility] visit. Discussed with patient the Pharmacist Collaborative Practice Agreement. Patient provided verbal and/or electronic (ex. [employer]) consent to participate in the collaborative practice agreement between the pharmacist and referred patient. This is in lieu of paper consent due to COVID-19 precautions and the use of remote/virtual visits. PLAN  Goals of therapy, common side effects, medication storage, and administration reviewed with patient. continue Dupixent 300mg every 14 days as prescribed. Recommended monitoring to complete: Eye exam, follow-up at next [facility] appointment. Recommended immunizations: [id number], patient will obtain. Follow-up next [facility] visit  Keep all scheduled appointments. Bruce Law, PharmD [facility]  Ambulatory Clinical Pharmacist  Specialty Medication Services  Phone: [phone]      For Pharmacy [address] in place:   Yes   Recommendation Provided To: Provider: 1 via Note to Provider and Patient/Caregiver: 1 via Telephone   Intervention Detail: Adherence Monitorin, Refill(s) Provided and Vaccine Recommended/Administered   Intervention Accepted By: Provider: 1 and Patient/Caregiver: 1   Time Spent (min): 60

## 2022-07-07 ENCOUNTER — PHARMACY VISIT (OUTPATIENT)
Dept: INTERNAL MEDICINE | Age: 32
End: 2022-07-07

## 2022-07-07 DIAGNOSIS — L20.9 ATOPIC DERMATITIS, UNSPECIFIED TYPE: ICD-10-CM

## 2022-07-07 RX ORDER — DUPILUMAB 300 MG/2ML
300 INJECTION, SOLUTION SUBCUTANEOUS
Qty: 4 ML | Refills: 6 | Status: SHIPPED | OUTPATIENT
Start: 2022-07-07

## 2022-07-07 RX ORDER — RUXOLITINIB 15 MG/G
CREAM TOPICAL DAILY PRN
COMMUNITY

## 2022-07-07 ASSESSMENT — PATIENT HEALTH QUESTIONNAIRE - PHQ9
1. LITTLE INTEREST OR PLEASURE IN DOING THINGS: 0
SUM OF ALL RESPONSES TO PHQ QUESTIONS 1-9: 0
SUM OF ALL RESPONSES TO PHQ QUESTIONS 1-9: 0
SUM OF ALL RESPONSES TO PHQ9 QUESTIONS 1 & 2: 0
SUM OF ALL RESPONSES TO PHQ QUESTIONS 1-9: 0
SUM OF ALL RESPONSES TO PHQ QUESTIONS 1-9: 0
2. FEELING DOWN, DEPRESSED OR HOPELESS: 0

## 2022-10-04 ENCOUNTER — TELEMEDICINE (OUTPATIENT)
Dept: FAMILY MEDICINE CLINIC | Age: 32
End: 2022-10-04
Payer: COMMERCIAL

## 2022-10-04 DIAGNOSIS — J01.90 ACUTE SINUSITIS, RECURRENCE NOT SPECIFIED, UNSPECIFIED LOCATION: Primary | ICD-10-CM

## 2022-10-04 PROCEDURE — 99213 OFFICE O/P EST LOW 20 MIN: CPT | Performed by: FAMILY MEDICINE

## 2022-10-04 RX ORDER — AMOXICILLIN AND CLAVULANATE POTASSIUM 875; 125 MG/1; MG/1
1 TABLET, FILM COATED ORAL 2 TIMES DAILY
Qty: 20 TABLET | Refills: 0 | Status: SHIPPED | OUTPATIENT
Start: 2022-10-04 | End: 2022-10-14

## 2022-10-04 RX ORDER — ALBUTEROL SULFATE 90 UG/1
2 AEROSOL, METERED RESPIRATORY (INHALATION) 4 TIMES DAILY PRN
Qty: 18 G | Refills: 0 | Status: SHIPPED | OUTPATIENT
Start: 2022-10-04

## 2022-10-04 NOTE — PROGRESS NOTES
TELEHEALTH EVALUATION -- Audio/Visual     Dawn Alonzo   YOB: 1990    Date of Visit:  10/4/2022    No Known Allergies  Current Outpatient Medications on File Prior to Visit   Medication Sig Dispense Refill    Ruxolitinib Phosphate (OPZELURA) 1.5 % CREA Apply topically daily as needed      dupilumab (DUPIXENT) 300 MG/2ML SOSY injection Inject 2 mLs into the skin every 14 days 4 mL 6     No current facility-administered medications on file prior to visit. Wt Readings from Last 3 Encounters:   20 154 lb (69.9 kg)   07/15/20 155 lb (70.3 kg)   20 157 lb (71.2 kg)     BP Readings from Last 3 Encounters:   20 126/78   20 120/88   19 112/76          Dawn Alonzo (:  1990) has requested to and consented to an audio/video evaluation for the concerns or medical issues discussed below. Chief Complaint   Patient presents with    Sinusitis     Onset 5 days. sinus pressure, congestion, chest tightness, dry cough. Other     Doxy. me text [phone]       HPI    Patient complains of 10 days of not feeling well. Started with sore throat and congestion. 5d ago it turned into sinus pressure and coughing. Has been able to do most activites. Stayed home from work today as she just isn't feeling well. No fevers. Has done 3 home covid tests which were negative. Has taken dayquil which helps a little bit. At night feels like when she takes a big breath it feels tight and makes her cough- improves with the dayquil. No change in taste or smell. Eating and drinking ok. The sinus pressure is the most bothersome symptoms. Atopic dermatitis:  on dupixent which has helped a lot- it has helped with her seasonal allergies.   Managed by her dermatologist.     Works as a speech and language pathologist at [employer]      Social History     Socioeconomic History    Marital status:      Spouse name: Not on file    Number of children: 2    Years of education: Not on file    Highest education level: Not on file   Occupational History    Occupation: speech therapist   Tobacco Use    Smoking status: Never    Smokeless tobacco: Never   Vaping Use    Vaping Use: Never used   Substance and Sexual Activity    Alcohol use: No     Alcohol/week: 4.0 standard drinks     Types: 4 Standard drinks or equivalent per week    Drug use: No    Sexual activity: Yes     Partners: Male     Birth control/protection: OCP   Other Topics Concern    Not on file   Social History Narrative    Not on file     Social Determinants of Health     Financial Resource Strain: Not on file   Food Insecurity: Not on file   Transportation Needs: Not on file   Physical Activity: Not on file   Stress: Not on file   Social Connections: Not on file   Intimate Partner Violence: Not on file   Housing Stability: Not on file         Review of Systems  As documented in the HPI. Currently no complaints of CP or J LUIS. Vital Signs: (As obtained by patient/caregiver or practitioner observation)    There were no vitals taken for this visit. Patient-Reported Vitals 10/4/2022   Patient-Reported Weight 140   Patient-Reported Height 5'3\"   Patient-Reported Systolic 398   Patient-Reported Diastolic 94   Patient-Reported Pulse 62   Patient-Reported Temperature -        Physical Exam  Constitutional:       General: She is not in acute distress. Appearance: Normal appearance. She is not ill-appearing. HENT:      Head: Normocephalic and atraumatic. Nose: Nose normal.   Pulmonary:      Effort: Pulmonary effort is normal. No respiratory distress. Neurological:      General: No focal deficit present. Mental Status: She is alert. Psychiatric:         Mood and Affect: Mood normal.         Behavior: Behavior normal.         Assessment/Plan     1. Acute sinusitis, recurrence not specified, unspecified location  Supportive care as discussed. The patient clinically appears stable.  Will treat with augmentin for a possible sinus infection. Albuterol prn. Flonase. Discussed indications for seeking additional medical care including new or worsening symptoms. - amoxicillin-clavulanate (AUGMENTIN) 875-125 MG per tablet; Take 1 tablet by mouth 2 times daily for 10 days Take with meals. Dispense: 20 tablet; Refill: 0  - albuterol sulfate HFA (VENTOLIN HFA) 108 (90 Base) MCG/ACT inhaler; Inhale 2 puffs into the lungs 4 times daily as needed for Wheezing  Dispense: 18 g; Refill: 0    Discussed medications with patient, who voiced understanding of their use and indications. All questions answered. Return for Physical.           Luciana Viramontes, was evaluated through a synchronous (real-time) audio-video encounter. The patient (or guardian if applicable) is aware that this is a billable service, which includes applicable co-pays. This Virtual Visit was conducted with patient's (and/or legal guardian's) consent. The visit was conducted pursuant to the emergency declaration under the [address] [place] [address] authority and the [employer] General Act. Patient identification was verified, and a caregiver was present when appropriate. The patient was located at Other: car in [place] . Provider was located at Home ([facility]): [address]. Patient identification was verified at the start of the visit: Yes    Total time spent on this encounter: Not billed by time. Services were provided through a video synchronous discussion virtually to substitute for in-person clinic visit. Documentation was done using voice recognition dragon software. Efforts were made to ensure accuracy; however, inadvertent, unintentional computerized transcription errors may be present. --Lenon Landau, MD on 10/4/2022    An electronic signature was used to authenticate this note.

## 2022-12-08 ENCOUNTER — TELEPHONE (OUTPATIENT)
Dept: PHARMACY | Age: 32
End: 2022-12-08

## 2022-12-08 NOTE — LETTER
[facility] [place]  [address], [place] [address]            22       Dr. Grace Acosta,     I am a pharmacist with the Specialty Medication Service offered through [facility] ([facility]) and we have mutual patient: Rita Found ( 1990). I have an upcoming telephone visit to discuss their [address] through our program.      If the patient has a signed Release of Information form on file, please fax most recent office visit notes to our office at [phone]  so we can add it to the patients chart with [facility]).            Thank you in advance,       Geo Harry, Malini  Ambulatory Clinical Pharmacist  Specialty Medication Service  Telephone: [phone] option 4   Fax: [phone]   Email: [email or web address]

## 2022-12-08 NOTE — TELEPHONE ENCOUNTER
Specialty Medication Service    Date: 12/8/2022  Patient's Name: Sergio Moon YOB: 1990            _____________________________________________________________________________________________    SMS f/u scheduled for 12/14/2022. Notes requested from specialist's office via fax.     UPDATE:  Notes received from specialist.     Mayda Jiménez CPhT  Clinical    Specialty Medication Service   [phone] option Jeffrey Ziegler [phone] in place:  Yes  Recommendation Provided To: Provider: 1 via Fax sent to office and Patient/Caregiver: 1 via Telephone  Intervention Detail: Scheduled Appointment  Intervention Accepted By: Provider: 1 and Patient/Caregiver: 1  Time Spent (min): 15

## 2022-12-14 ENCOUNTER — PHARMACY VISIT (OUTPATIENT)
Dept: PHARMACY | Age: 32
End: 2022-12-14

## 2022-12-14 DIAGNOSIS — L20.9 ATOPIC DERMATITIS, UNSPECIFIED TYPE: Primary | ICD-10-CM

## 2022-12-14 NOTE — LETTER
Dr. Favio Graves,     I am a pharmacist with the Specialty Medication Service Program offered through [facility] ([facility]) and we have mutual patient: Ervin Lagos (: 1990). I have an upcoming telephone visit to discuss their [address] through our program.     Please fax most recent office visit notes to our office at [phone] so we can add it to the patient's chart with [facility] ([facility]).      Thank you in advance,    Edna Victoria, Malini  Ambulatory Clinical Pharmacist  Specialty Medication Service   Phone: [phone] option 4

## 2022-12-14 NOTE — PROGRESS NOTES
Specialty Medication Service    Patient's Name: Jesus Morelos YOB: 1990      Reason for visit: Jesus Morelos is a 34 y.o. female presenting today for Specialty Medication Service visit follow up. Patient last seen by [facility] 7/7/2022. Patient continues on SMS formulary medication, Dupixent. Pharmacy completed Specialty Medication Service visit for medication monitoring and counseling. Medication list updated. Specialty Medication: Dupixent 300mg/2mL SOSY   Frequency: Every 14 days   Indication: Atopic dermatitis   Initially Diagnosed: childhood   Additional Therapy:   Baltazar Kitten 1.5% cream PRN  Previous Therapy:   Other topical medications     Specialist:   Cody Chinchilla MD  [facility]  [address] [place]ran [facility]  P: [phone]  F: [phone]  Specialist Progress Note Available: Yes, in media tab  Last Specialist Visit: 4/7/22  Doing well on therapy. Discussed use of emollients and avoiding scented products. Continue Dupixent therapy.      No Known Allergies    Past Medical History:   Diagnosis Date    Eczema     External hemorrhoids     Pregnancy induced hypertension     with 1st pregnancy, no problems with this pregnancy    Pyelonephritis 2011      Social History     Tobacco Use    Smoking status: Never    Smokeless tobacco: Never   Substance Use Topics    Alcohol use: No     Alcohol/week: 4.0 standard drinks     Types: 4 Standard drinks or equivalent per week     Family History   Problem Relation Age of Onset    High Blood Pressure Mother     High Blood Pressure Father     High Blood Pressure Maternal Grandfather     Heart Disease Maternal Grandfather     Diabetes Maternal Grandfather     High Cholesterol Maternal Grandfather     Breast Cancer Paternal Grandmother     High Blood Pressure Paternal Grandfather     Cancer Maternal Aunt        INTERM HISTORY  Have you been diagnosed with any additional conditions since we last talked? no  Have you developed any new allergies since we last talked? no  Have you stopped taking any medications or supplements since we last talked? no  Have you started taking any additional medications or supplements since we last talked? no    REVIEW OF CURRENT DISEASE STATE  Dermatitis/Eczema: Rochelle Avila is here for continued evaluation of skin condition related to speciality medication use. Prior to medication therapy patient reports that her hands were the most affected area. Today patient reports that she has made significant progress with Dupixent. Prior to therapy she states at this time last year her hands would have been dry and cracked due to the cold weather. Today patient reports skin rash located on her side. These spots are very mild and well controlled with topical cream. Patient describes the rash as red and itchy. Triggers include cold weather and strong scented/low grade soaps. · Number of atopic dermatitis flares in the last month? 0  · Considering all the ways in which illness and health conditions may affect you at this time, please indicate below how you are doing: (0 = very well, 10 = very poorly)? 1  · How would you rate your pain on average? (0 = no pain, 10 = worst pain imaginable) 0  · During the past 4 weeks, have you missed any activities of daily living (work/school)? No  · During the past 4 weeks, have you had to seek urgent care? No    MEDICATIONS  Current Outpatient Medications   Medication Sig Dispense Refill    Ruxolitinib Phosphate (OPZELURA) 1.5 % CREA Apply topically daily as needed      dupilumab (DUPIXENT) 300 MG/2ML SOSY injection Inject 2 mLs into the skin every 14 days 4 mL 6     No current facility-administered medications for this visit. Current Specialty Medication:   Dupilumab (Dupixent)   Indication Specific dose: Atopic Dermatitis: 600 mg then 300 mg every 2 weeks  Warnings/Precautions: Hypersensitivity; Eosinophilia and vasculitis;  Conjunctivitis and keratitis; Dupilumab antibodies, including neutralizing antibodies, may develop; Avoid the use of live vaccines   Recommended Monitoring: Annual PFTs (Asthma); Annual Eye exams  Storage: Store refrigerated at 36°F to 46°F (2°C to 8°C) in the original carton to protect from light. Do NOT freeze. Do NOT expose to heat. Do NOT shake. Handling: Pre-filled syringes may be kept at room temperature up to 77°F (25°C) for a maximum of 14 days. Do not expose to heat or direct sunlight. Prior to administration: Remove from refrigerator and allow to naturally warm up to room temperature before use. Do not use beyond expiration date on label. Patient Reported Side Effects: none  Specialty Medication Start Date: 5/2021  Appropriate Dose: Yes    Describe your medication adherence over the last 4 weeks: Very Good  How many doses have you missed in the last 4 weeks, if any? 0  How confident are you to follow the injection process and the treatment plan? (0-10) 10 Who injects? Self   Does the patient have a current infection of any kind? No    Contraindications to therapy present? No    Drug Interactions:  No clinically significant interactions identified via [employer] Interaction Analysis as category D or higher.  _____________________________________________________________________  Renal Dosing:  Creatinine Clearance: CrCl cannot be calculated (Patient's most recent lab result is older than the maximum 180 days allowed. ). No renal adjustments necessary.     LABS  Lab Results   Component Value Date/Time    BUN 13 10/30/2020 07:21 AM    CREATININE 0.7 10/30/2020 07:21 AM     Lab Results   Component Value Date/Time    WBC 6.0 10/30/2020 07:21 AM    HGB 14.2 10/30/2020 07:21 AM    HCT 43.3 10/30/2020 07:21 AM    RBC 4.87 10/30/2020 07:21 AM     10/30/2020 07:21 AM     Lab Results   Component Value Date/Time    ALKPHOS 54 10/30/2020 07:21 AM    ALT 14 10/30/2020 07:21 AM    AST 14 10/30/2020 07:21 AM    BILITOT 0.4 10/30/2020 07:21 AM IMMUNIZATIONS  Immunization History   Administered Date(s) Administered    COVID-19, MODERNA BLUE border, Primary or Immunocompromised, (age 12y+), IM, 100 mcg/0.5mL 12/28/2020, 01/25/2021    COVID-19, PFIZER PURPLE top, DILUTE for use, (age 15 y+), 30mcg/0.3mL 12/14/2021    HPV Quadrivalent (Gardasil) 07/31/2007, 10/27/2007, 12/14/2007    Hepatitis B 08/19/1996, 09/21/1996, 05/24/1997    Hib vaccine 11/19/1990    Influenza Vaccine, unspecified formulation 11/04/2016    Influenza Virus Vaccine 11/08/2014, 11/04/2016, 10/11/2017, 09/26/2018, 09/30/2018, 10/16/2019, 10/05/2021    Influenza, Triv, 3 Years and older, IM (Afluria (5 yrs and older) 10/05/2021, 10/24/2022    MMR 11/19/1990, 08/19/1996    Meningococcal MPSV4 (Menomune) 07/31/2007    Polio OPV 1990, 1989, 01/03/1990, 02/22/1991, 08/12/1993    Tdap (Boostrix, Adacel) 02/12/2010, 10/27/2016      Immunization status: up to date and documented. ASSESSMENTS  Fall Risk 7/7/2022 5/27/2021   2 or more falls in past year? no no   Fall with injury in past year? no no     PROMIS V1.1 [employer] 5/27/2021   In general, would you say your health is: 3   In general, would you say your quality of life is: 3   In general, how would you rate your physical health? 3   In general, how would you rate your mental health, including your mood and your ability to think? 3   In general, how would you rate your satisfaction with your social activities and relationships? 3   In general, please rate how well you carry out your usual social activities and roles. (This includes activities at home, at work and in your community, and responsibilities as a parent, child, spouse, employee, friend, etc.) 3   To what extent are you able to carry out your everyday physical activities such as walking, climbing stairs, carrying groceries, or moving a chair?  4   In the past 7 days how often have you been bothered by emotional problems such as feeling anxious, depressed or irritable? 3   In the past 7 days how would you rate your fatigue on average? 4   In the past 7 days how would you rate your pain on average? 0   Mode of Collection 1   Person Completing Survey 0   PROMIS Physical Score 11   PROMIS Mental Score 12       Dermatitis/Eczema  Severa Kapur is a 34 y.o. female being treated for Atopic Dermatitis  Medication Reconciliation completed (information obtained from patient), no drug-drug interactions identified. Allergy and diagnosis info reviewed and updated. Severa Kapur has a history remarkable for the following conditions: atopic dermatitis/eczema. Current therapy includes: Dupixent 300mg every 14 days   Medication Effectiveness: Patient disease is  well controlled on current therapy. Patient had no questions regarding the medication's warnings, precautions, and contraindications. Confirmed appropriate storage and disposal.  Patient had no concerns with the administration process. Current disease state symptoms include: Minimal symptoms reported by patient. She has a spot on her side that is very mild. Skin symptoms respond well to PRN cream when they do appear. Reports that overall her skin has made good improvement over the past year. No side effects/adverse events reported, and no adherence issues identified. Functional and cognitive limitations include: None  Patient is not considered high risk. Based on patient feedback/results of the assessment, the therapy is  still appropriate. No medication-related problem(s) or patient need(s) identified that would require a care plan. Follow up in 180 days     2. Immunization  Immunization status: up to date and documented. 3. Drug Interaction  No clinically significant interactions identified via [employer] Interaction Analysis as category D or higher. 4. Other Identified Potential Issues  Annual Medical Director visit - patient due for annual visit with [facility] medical director in January 2023.  Unable to schedule patient at this time due to unknown availability of medical director. Patient agreeable to scheduling call in January. PLAN  Goals of therapy, common side effects, medication storage, and administration reviewed with patient. continue Dupixent 300mg every 14 days as prescribed    Recommended monitoring to complete: None at this time   Recommended immunizations: None at this time   Keep all scheduled appointments. For [address] [facility] in place:  Yes  Recommendation Provided To: Patient/Caregiver: 0 via Virtual Visit  Intervention Accepted By: Patient/Caregiver: 0  Time Spent (min): 60    Kevin Jones was evaluated through a synchronous (real-time) audio encounter. Patient identification was verified at the start of the visit. She (or guardian if applicable) is aware that this is a billable service, which includes applicable co-pays. This visit was conducted with the patient's (and/or legal guardian's) verbal consent. She has not had a related appointment within my department in the past 7 days or scheduled within the next 24 hours. The patient was located at Home: [address] [place] [address]. The provider was located at Home ([address]: [place].

## 2023-01-26 ENCOUNTER — TELEPHONE (OUTPATIENT)
Dept: PHARMACY | Age: 33
End: 2023-01-26

## 2023-01-26 NOTE — TELEPHONE ENCOUNTER
Specialty Medication Service    Date: 1/26/2023  Patient's Name: Addie Diaz YOB: 1990            _____________________________________________________________________________________________    Lupe Marsha with patient  to schedule annual Medical Director  appointment for Specialty Medication Services. Last medical director appointment 1/25/2022. Patient scheduled for annual visit 2/2/2023.      Deniz LudwigD  Ambulatory Clinical Pharmacist   Specialty Medication Services   Phone: [phone] option 4  1/26/2023 2:42 PM    For Pharmacy Admin Tracking Only    Program: [facility]  CPA in place:  Yes  Recommendation Provided To: Patient/Caregiver: 1 via Telephone  Intervention Detail: Scheduled Appointment  Intervention Accepted By: Patient/Caregiver: 1   Time Spent (min): 10

## 2023-02-16 ENCOUNTER — PHARMACY VISIT (OUTPATIENT)
Dept: PHARMACY | Age: 33
End: 2023-02-16

## 2023-02-16 DIAGNOSIS — L20.9 ATOPIC DERMATITIS, UNSPECIFIED TYPE: Primary | ICD-10-CM

## 2023-02-16 NOTE — PROGRESS NOTES
I called the patient  for annual follow-up for the [facility] subspecialty pharmacy program.    The patient has a history of atopic dermatitis. The patient is under the care of dermatology. The patient says the medication is working well. The patient has no major side effects from the medication. The patient takes [address]. The patient is getting lab work regularly. The patient has had no issues getting the medication from the pharmacy. I told the patient that primary management will be by the patient's specialist. The patient verbalized understanding. I told the patient that I collaborate closely with the pharmacist in the care of the patient and to contact us with any issues.        Juan Daniel Paz MD 2/16/2023 2:36 PM

## 2023-03-16 ENCOUNTER — OFFICE VISIT (OUTPATIENT)
Dept: FAMILY MEDICINE CLINIC | Age: 33
End: 2023-03-16
Payer: COMMERCIAL

## 2023-03-16 VITALS
OXYGEN SATURATION: 98 % | SYSTOLIC BLOOD PRESSURE: 124 MMHG | BODY MASS INDEX: 24.63 KG/M2 | HEART RATE: 79 BPM | HEIGHT: 63 IN | WEIGHT: 139 LBS | DIASTOLIC BLOOD PRESSURE: 76 MMHG

## 2023-03-16 DIAGNOSIS — Z00.00 ANNUAL PHYSICAL EXAM: Primary | ICD-10-CM

## 2023-03-16 PROCEDURE — 99395 PREV VISIT EST AGE 18-39: CPT | Performed by: FAMILY MEDICINE

## 2023-03-16 RX ORDER — KETOCONAZOLE 20 MG/G
CREAM TOPICAL
COMMUNITY
Start: 2023-03-02

## 2023-03-16 SDOH — ECONOMIC STABILITY: FOOD INSECURITY: WITHIN THE PAST 12 MONTHS, THE FOOD YOU BOUGHT JUST DIDN'T LAST AND YOU DIDN'T HAVE MONEY TO GET MORE.: NEVER TRUE

## 2023-03-16 SDOH — ECONOMIC STABILITY: FOOD INSECURITY: WITHIN THE PAST 12 MONTHS, YOU WORRIED THAT YOUR FOOD WOULD RUN OUT BEFORE YOU GOT MONEY TO BUY MORE.: NEVER TRUE

## 2023-03-16 SDOH — ECONOMIC STABILITY: INCOME INSECURITY: HOW HARD IS IT FOR YOU TO PAY FOR THE VERY BASICS LIKE FOOD, HOUSING, MEDICAL CARE, AND HEATING?: NOT HARD AT ALL

## 2023-03-16 SDOH — ECONOMIC STABILITY: HOUSING INSECURITY
IN THE LAST 12 MONTHS, WAS THERE A TIME WHEN YOU DID NOT HAVE A STEADY PLACE TO SLEEP OR SLEPT IN A SHELTER (INCLUDING NOW)?: NO

## 2023-03-16 ASSESSMENT — PATIENT HEALTH QUESTIONNAIRE - PHQ9
1. LITTLE INTEREST OR PLEASURE IN DOING THINGS: 0
2. FEELING DOWN, DEPRESSED OR HOPELESS: 0
SUM OF ALL RESPONSES TO PHQ QUESTIONS 1-9: 0
SUM OF ALL RESPONSES TO PHQ9 QUESTIONS 1 & 2: 0

## 2023-03-16 NOTE — PROGRESS NOTES
Chief Complaint:   Roshni Johnson is a 34 y.o. female who presents for complete physical examination. History of Present Illness:    Lost 25lbs about 3 years ago and has kept it off. On dupixent for eczema with opzelura for breakouts. This is working well. Started nizoral about 2 weeks ago for fungal rash, might be helping some.        Past Medical History:   Diagnosis Date    Eczema     External hemorrhoids     Pregnancy induced hypertension     with 1st pregnancy, no problems with this pregnancy    Pyelonephritis 2011       Past Surgical History:   Procedure Laterality Date    WISDOM TOOTH EXTRACTION         Outpatient Medications Marked as Taking for the 3/16/23 encounter (Office Visit) with Beatrice Glez MD   Medication Sig Dispense Refill    ketoconazole (NIZORAL) 2 % cream       Ruxolitinib Phosphate (OPZELURA) 1.5 % CREA Apply topically daily as needed      dupilumab (DUPIXENT) 300 MG/2ML SOSY injection Inject 2 mLs into the skin every 14 days 4 mL 6     No Known Allergies    Social History     Socioeconomic History    Marital status:     Number of children: 2   Occupational History    Occupation: speech therapist   Tobacco Use    Smoking status: Never    Smokeless tobacco: Never   Vaping Use    Vaping Use: Never used   Substance and Sexual Activity    Alcohol use: No     Alcohol/week: 4.0 standard drinks     Types: 4 Standard drinks or equivalent per week    Drug use: No    Sexual activity: Yes     Partners: Male     Birth control/protection: OCP     Social Determinants of Health     Financial Resource Strain: Low Risk     Difficulty of Paying Living Expenses: Not hard at all   Food Insecurity: No Food Insecurity    Worried About Running Out of Food in the Last Year: Never true    Ran Out of Food in the Last Year: Never true   Transportation Needs: Unknown    Lack of Transportation (Non-Medical): No   Housing Stability: Unknown    Unstable Housing in the Last Year: No       Family History Problem Relation Age of Onset    High Blood Pressure Mother     High Blood Pressure Father     High Blood Pressure Maternal Grandfather     Heart Disease Maternal Grandfather     Diabetes Maternal Grandfather     High Cholesterol Maternal Grandfather     Breast Cancer Paternal Grandmother     High Blood Pressure Paternal Grandfather     Cancer Maternal Aunt          Health Maintenance   Topic Date Due    Cervical cancer screen  10/18/2020    COVID-19 Vaccine (4 - Booster for Moderna series) 02/08/2022    Depression Screen  07/07/2023    DTaP/Tdap/Td vaccine (8 - Td or Tdap) 10/27/2026    Hib vaccine  Completed    Flu vaccine  Completed    Hepatitis C screen  Completed    HIV screen  Completed    Hepatitis A vaccine  Aged Out    Meningococcal (ACWY) vaccine  Aged Out    Pneumococcal 0-64 years Vaccine  Aged Out    Varicella vaccine  Discontinued         Review Of Systems:  Skin: no changing moles, abnormal pigmentation, rash, scaling, itching, masses, hair or nail changes  Eyes: no blurring, diplopia, or eye pain  Ears/Nose/Throat: no hearing loss, tinnitus, vertigo, nosebleed, nasal congestion, rhinorrhea, sore throat  Respiratory: no cough, pleuritic chest pain, dyspnea, or wheezing  Cardiovascular: no angina, GARCIA, orthopnea, PND, palpitations, or claudication  Gastrointestinal: no nausea, vomiting, heartburn, diarrhea, constipation, bloating, or abdominal pain  Genitourinary: no urinary urgency, frequency, dysuria, nocturia, hesitancy, or incontinence  Musculoskeletal: no arthritis, arthralgia, myalgia, weakness, or morning stiffness  Neurologic: no paralysis, paresis, paresthesia, seizures, tremors, or headaches  Hematologic/Lymphatic/Immunologic: no anemia, abnormal bleeding/bruising, fever, chills, night sweats, swollen glands, or unexplained weight loss  Endocrine: no heat or cold intolerance and no polyphagia, polydipsia, or polyuria    PHYSICAL EXAMINATION:  /76 (Site: Right Upper Arm, Position: Sitting, Cuff Size: Medium Adult)   Pulse 79   Ht 5' 3\" (1.6 m)   Wt 139 lb (63 kg)   SpO2 98%   BMI 24.62 kg/m²   General appearance: healthy, alert, no distress  Skin: Skin color, texture, turgor normal. No rashes or suspicious lesions. No induration or tightening palpated. Head: Normocephalic. No masses, lesions, tenderness or abnormalities  Eyes: Conjunctivae/corneas clear. PERRL, EOM's intact. Ears: External ears normal. Canals clear. TM's clear bilaterally. Hearing grossly normal.  Nose/Sinuses: Nares normal.   Oropharynx: Lips, mucosa, and tongue normal. Teeth and gums normal. Oropharynx clear with no exudate seen. Neck: Neck supple, and symmetric. No adenopathy. Thyroid symmetric, normal size, without nodule. Trachea is midline. Back: Back symmetric, no curvature. ROM normal. No CVA tenderness. Lungs: Good diaphragmatic excursion. Lungs clear to auscultation bilaterally. No retractions or use of accessory muscles. Heart: PMI is not displaced, and no thrill noted. Regular rate and rhythm, with no rub, murmur or gallop noted. Breasts: Exam deferred to OB/GYN  Abdomen: Abdomen soft, non-tender. BS normal. No masses, organomegaly. No hernia noted. Extremities: Extremities normal. No deformities, edema, or skin discoloration. No cyanosis or clubbing noted to the nails. Hands and feet were warm and well-perfused with palpable dorsalis pedis pulses bilaterally. Lymph: No lymphadenopathy of the neck or supraclavicular regions. Musculoskeletal: Spine ROM normal. Muscular strength intact. Neuro: Cranial nerves intact, gait normal. No focal weakness. Pelvic: Exam deferred to OB/GYN        ASSESSMENT/PLAN:  1. Annual physical exam  - CBC with Auto Differential; Future  - Comprehensive Metabolic Panel; Future  - Lipid Panel; Future  - TSH with Reflex; Future      All health maintenance issues were updated.   Recommend begin progressive daily aerobic exercise program, continue current medications, and return for routine annual checkups

## 2023-03-28 DIAGNOSIS — L20.9 ATOPIC DERMATITIS, UNSPECIFIED TYPE: ICD-10-CM

## 2023-03-28 RX ORDER — DUPILUMAB 300 MG/2ML
300 INJECTION, SOLUTION SUBCUTANEOUS
Qty: 4 ML | Refills: 7 | Status: SHIPPED | OUTPATIENT
Start: 2023-03-28

## 2023-03-28 NOTE — TELEPHONE ENCOUNTER
Specialty Medication Service    Date: 3/28/2023  Patient's Name: Jaycee Montalvo YOB: 1990            _____________________________________________________________________________________________    Jaycee Montalvo is a 34 y.o. female enrolled in the Specialty Medication Service. We received a refill request for Dupixent on 3/28/2023. Original Rx was written for 1+7 fills on 3/28/2023.      Thank you,    Frankie Montesinos      Requested Prescriptions     Pending Prescriptions Disp Refills    dupilumab (DUPIXENT) 300 MG/2ML SOSY injection 4 mL 7     Sig: Inject 2 mLs into the skin every 14 days

## 2023-03-28 NOTE — TELEPHONE ENCOUNTER
CLINICAL PHARMACY NOTE - SPECIALTY MEDICATION SERVICE     We received a refill request for Dupixent on 3/28/23. Patient does have a CPA on file. Patient last met with SMS on 12/14/2022 and is due for follow up in June 2023. Follow up visit is not scheduled at this time. Patient's last visit with specialist (dermatology - Dr. Angeles Fairchild) was on an unknown date. Will send fax to specialist's office to obtain most recent office visit documentation. No relevant labwork for monitoring. Will ensure patient has had annual eye exam at follow-up visit in June. Original Rx was written for #4 mL + 7 refills on 3/28/2023. Will order refill at this time.      Aline Ibrahim, PharmD  Ambulatory Clinical Pharmacist   Specialty Medication Services   Phone: [phone] option 4  3/28/2023 4:22 PM      For Pharmacy Admin Tracking Only    Program: [facility]  CPA in place:  Yes  Recommendation Provided To: Patient/Caregiver: 1 via Telephone  Intervention Detail: Refill(s) Provided  Intervention Accepted By: Patient/Caregiver: 1   Time Spent (min): 15

## 2023-03-31 ENCOUNTER — HOSPITAL ENCOUNTER (OUTPATIENT)
Age: 33
Discharge: HOME | End: 2023-03-31
Payer: COMMERCIAL

## 2023-03-31 DIAGNOSIS — Z00.00 ANNUAL PHYSICAL EXAM: ICD-10-CM

## 2023-03-31 LAB
ALBUMIN SERPL-MCNC: 4.3 G/DL (ref 3.4–5)
ALBUMIN/GLOB SERPL: 1.5 {RATIO} (ref 1.1–2.2)
ALP SERPL-CCNC: 43 U/L (ref 40–129)
ALT SERPL-CCNC: 14 U/L (ref 10–40)
ANION GAP SERPL CALCULATED.3IONS-SCNC: 12 MMOL/L (ref 3–16)
AST SERPL-CCNC: 17 U/L (ref 15–37)
BASOPHILS # BLD: 0 K/UL (ref 0–0.2)
BASOPHILS NFR BLD: 0.6 %
BILIRUB SERPL-MCNC: 0.4 MG/DL (ref 0–1)
BUN SERPL-MCNC: 18 MG/DL (ref 7–20)
CALCIUM SERPL-MCNC: 9.5 MG/DL (ref 8.3–10.6)
CHLORIDE SERPL-SCNC: 105 MMOL/L (ref 99–110)
CHOLEST SERPL-MCNC: 159 MG/DL (ref 0–199)
CO2 SERPL-SCNC: 24 MMOL/L (ref 21–32)
CREAT SERPL-MCNC: 0.7 MG/DL (ref 0.6–1.1)
DEPRECATED RDW RBC AUTO: 13 % (ref 12.4–15.4)
EOSINOPHIL # BLD: 0.1 K/UL (ref 0–0.6)
EOSINOPHIL NFR BLD: 2.3 %
GFR SERPLBLD CREATININE-BSD FMLA CKD-EPI: >60 ML/MIN/{1.73_M2}
GLUCOSE SERPL-MCNC: 96 MG/DL (ref 70–99)
HCT VFR BLD AUTO: 42.3 % (ref 36–48)
HDLC SERPL-MCNC: 77 MG/DL (ref 40–60)
HGB BLD-MCNC: 14 G/DL (ref 12–16)
LDLC SERPL CALC-MCNC: ABNORMAL MG/DL
LDLC SERPL-MCNC: 86 MG/DL
LYMPHOCYTES # BLD: 1.4 K/UL (ref 1–5.1)
LYMPHOCYTES NFR BLD: 30.4 %
MCH RBC QN AUTO: 29.2 PG (ref 26–34)
MCHC RBC AUTO-ENTMCNC: 33.2 G/DL (ref 31–36)
MCV RBC AUTO: 88 FL (ref 80–100)
MONOCYTES # BLD: 0.5 K/UL (ref 0–1.3)
MONOCYTES NFR BLD: 10.3 %
NEUTROPHILS # BLD: 2.6 K/UL (ref 1.7–7.7)
NEUTROPHILS NFR BLD: 56.4 %
PLATELET # BLD AUTO: 190 K/UL (ref 135–450)
PMV BLD AUTO: 9.8 FL (ref 5–10.5)
POTASSIUM SERPL-SCNC: 5.1 MMOL/L (ref 3.5–5.1)
PROT SERPL-MCNC: 7.2 G/DL (ref 6.4–8.2)
RBC # BLD AUTO: 4.81 M/UL (ref 4–5.2)
SODIUM SERPL-SCNC: 141 MMOL/L (ref 136–145)
TRIGL SERPL-MCNC: 29 MG/DL (ref 0–150)
TSH SERPL DL<=0.005 MIU/L-ACNC: 1.53 UIU/ML (ref 0.27–4.2)
VLDLC SERPL CALC-MCNC: ABNORMAL MG/DL
WBC # BLD AUTO: 4.6 K/UL (ref 4–11)

## 2023-03-31 PROCEDURE — 84443 ASSAY THYROID STIM HORMONE: CPT

## 2023-03-31 PROCEDURE — 80061 LIPID PANEL: CPT

## 2023-03-31 PROCEDURE — 36415 COLL VENOUS BLD VENIPUNCTURE: CPT

## 2023-03-31 PROCEDURE — 80053 COMPREHEN METABOLIC PANEL: CPT

## 2023-03-31 PROCEDURE — 85025 COMPLETE CBC W/AUTO DIFF WBC: CPT

## 2023-05-18 ENCOUNTER — TELEPHONE (OUTPATIENT)
Dept: PHARMACY | Age: 33
End: 2023-05-18

## 2023-06-08 ENCOUNTER — PHARMACY VISIT (OUTPATIENT)
Dept: PHARMACY | Age: 33
End: 2023-06-08

## 2023-06-08 DIAGNOSIS — L20.9 ATOPIC DERMATITIS, UNSPECIFIED TYPE: Primary | ICD-10-CM

## 2023-06-08 ASSESSMENT — PROMIS GLOBAL HEALTH SCALE
IN THE PAST 7 DAYS, HOW WOULD YOU RATE YOUR PAIN ON AVERAGE [ON A SCALE FROM 0 (NO PAIN) TO 10 (WORST IMAGINABLE PAIN)]?: 0
IN GENERAL, PLEASE RATE HOW WELL YOU CARRY OUT YOUR USUAL SOCIAL ACTIVITIES (INCLUDES ACTIVITIES AT HOME, AT WORK, AND IN YOUR COMMUNITY, AND RESPONSIBILITIES AS A PARENT, CHILD, SPOUSE, EMPLOYEE, FRIEND, ETC) [ON A SCALE OF 1 (POOR) TO 5 (EXCELLENT)]?: 5
IN GENERAL, HOW WOULD YOU RATE YOUR SATISFACTION WITH YOUR SOCIAL ACTIVITIES AND RELATIONSHIPS [ON A SCALE OF 1 (POOR) TO 5 (EXCELLENT)]?: 5
IN GENERAL, WOULD YOU SAY YOUR QUALITY OF LIFE IS...[ON A SCALE OF 1 (POOR) TO 5 (EXCELLENT)]: 5
SUM OF RESPONSES TO QUESTIONS 3, 6, 7, & 8: 13
IN GENERAL, HOW WOULD YOU RATE YOUR PHYSICAL HEALTH [ON A SCALE OF 1 (POOR) TO 5 (EXCELLENT)]?: 4
IN GENERAL, HOW WOULD YOU RATE YOUR MENTAL HEALTH, INCLUDING YOUR MOOD AND YOUR ABILITY TO THINK [ON A SCALE OF 1 (POOR) TO 5 (EXCELLENT)]?: 5
SUM OF RESPONSES TO QUESTIONS 2, 4, 5, & 10: 19
TO WHAT EXTENT ARE YOU ABLE TO CARRY OUT YOUR EVERYDAY PHYSICAL ACTIVITIES SUCH AS WALKING, CLIMBING STAIRS, CARRYING GROCERIES, OR MOVING A CHAIR [ON A SCALE OF 1 (NOT AT ALL) TO 5 (COMPLETELY)]?: 5
IN THE PAST 7 DAYS, HOW OFTEN HAVE YOU BEEN BOTHERED BY EMOTIONAL PROBLEMS, SUCH AS FEELING ANXIOUS, DEPRESSED, OR IRRITABLE [ON A SCALE FROM 1 (NEVER) TO 5 (ALWAYS)]?: 4
IN THE PAST 7 DAYS, HOW WOULD YOU RATE YOUR FATIGUE ON AVERAGE [ON A SCALE FROM 1 (NONE) TO 5 (VERY SEVERE)]?: 4
IN GENERAL, WOULD YOU SAY YOUR HEALTH IS...[ON A SCALE OF 1 (POOR) TO 5 (EXCELLENT)]: 5

## 2023-06-08 ASSESSMENT — PATIENT HEALTH QUESTIONNAIRE - PHQ9
SUM OF ALL RESPONSES TO PHQ QUESTIONS 1-9: 0
SUM OF ALL RESPONSES TO PHQ9 QUESTIONS 1 & 2: 0
SUM OF ALL RESPONSES TO PHQ QUESTIONS 1-9: 0
2. FEELING DOWN, DEPRESSED OR HOPELESS: 0
1. LITTLE INTEREST OR PLEASURE IN DOING THINGS: 0
SUM OF ALL RESPONSES TO PHQ QUESTIONS 1-9: 0
SUM OF ALL RESPONSES TO PHQ QUESTIONS 1-9: 0

## 2023-06-08 NOTE — PROGRESS NOTES
Specialty Medication Service    Patient's Name: Elizabet Mederos YOB: 1990      Reason for visit: Elizabet Mederos is a 34 y.o. female presenting today for Specialty Medication Service visit follow up. Patient last seen by [facility] 12/14/2022. Patient continues on SMS formulary medication, Dupixent. Pharmacy completed Specialty Medication Service visit for medication monitoring and counseling. Medication list updated.     Specialty Medication: Dupixent 300mg/2mL SOSY   Frequency: Every 14 days   Indication: Atopic dermatitis   Initially Diagnosed: childhood   Additional Therapy:   Creasie Oliver 1.5% cream PRN  Previous Therapy:   Other topical medications     Specialist:   Nichelle Carvajal MD  [facility]  [address] [place] [address] Dr Crys fieldso [facility]  P: [phone]  F: [phone]  Specialist Progress Note Available: No  Last Specialist Visit: faxed for most recent office notes      No Known Allergies    Past Medical History:   Diagnosis Date    Eczema     External hemorrhoids     Pregnancy induced hypertension     with 1st pregnancy, no problems with this pregnancy    Pyelonephritis 2011      Social History     Tobacco Use    Smoking status: Never    Smokeless tobacco: Never   Substance Use Topics    Alcohol use: No     Alcohol/week: 4.0 standard drinks     Types: 4 Standard drinks or equivalent per week     Family History   Problem Relation Age of Onset    High Blood Pressure Mother     High Blood Pressure Father     High Blood Pressure Maternal Grandfather     Heart Disease Maternal Grandfather     Diabetes Maternal Grandfather     High Cholesterol Maternal Grandfather     Breast Cancer Paternal Grandmother     High Blood Pressure Paternal Grandfather     Cancer Maternal Aunt        INTERM HISTORY  Have you been diagnosed with any additional conditions since we last talked? no  Have you developed any new allergies since we last talked? no  Have you stopped taking any medications or supplements since we

## 2023-06-09 NOTE — TELEPHONE ENCOUNTER
Specialty Medication Service    Date: 5/18/2023  Patient's Name: Julita Barger YOB: 1990            _____________________________________________________________________________________________    Reached patient to schedule PharmD follow up appointment for Specialty Medication Services.  Patient scheduled 6/8/2023 at 11:00 AM.    Thomas Alfred PharmD  Ambulatory Clinical Pharmacist   Specialty Medication Services   Phone: [phone] option 4  5/18/2023 12:43 PM    For Pharmacy Admin Tracking Only    Program: [facility]  CPA in place:  Yes  Recommendation Provided To: Patient/Caregiver: 1 via Telephone  Intervention Detail: Scheduled Appointment  Intervention Accepted By: Patient/Caregiver: 1   Time Spent (min): 10
Additional Progress Note...

## 2023-11-16 ENCOUNTER — ENROLLMENT (OUTPATIENT)
Dept: PHARMACY | Age: 33
End: 2023-11-16

## 2023-11-22 ENCOUNTER — TELEPHONE (OUTPATIENT)
Dept: PHARMACY | Age: 33
End: 2023-11-22

## 2023-11-22 DIAGNOSIS — L20.9 ATOPIC DERMATITIS, UNSPECIFIED TYPE: ICD-10-CM

## 2023-11-22 RX ORDER — DUPILUMAB 300 MG/2ML
300 INJECTION, SOLUTION SUBCUTANEOUS
Qty: 4 ML | Refills: 3 | Status: SHIPPED | OUTPATIENT
Start: 2023-11-22

## 2023-11-22 NOTE — TELEPHONE ENCOUNTER
Specialty Medication Service    Date: 11/22/2023  Patient's Name: Leidy Babcock YOB: 1990            _____________________________________________________________________________________________    Leidy Babcock is a 28 y.o. female enrolled in the Specialty Medication Service. We received a refill request for Dupixent on 11/22/23. Original Rx was written for 5 fills on 11/20/23.      Thank you,    Zaire Aleman      Requested Prescriptions     Pending Prescriptions Disp Refills    dupilumab (DUPIXENT) 300 MG/2ML SOSY injection 4 mL 4     Sig: Inject 2 mLs into the skin every 14 days

## 2023-11-22 NOTE — TELEPHONE ENCOUNTER
CLINICAL PHARMACY NOTE - SPECIALTY MEDICATION SERVICE      America Kim is a 28 y.o.  female enrolled in the Specialty Medication Service. Chart reviewed and patient is compliant with SMS program requirements. Patient does have a signed CPA on file. Next SMS visit is planned for December - will begin outreach in next week. Labs completed in March; no routine lab monitoring needed for Dupixent therapy. Will approve refill for #4mL + 3 refills per original specialist's order (Dr. Yelena Mendez) on 11/20/2023.       Melquiades Padilla PharmD  Ambulatory Clinical Pharmacist   Specialty Medication Services   Phone: [phone] option 4  11/22/2023 2:08 PM      For Pharmacy Admin Tracking Only    Program: [facility]  CPA in place:  Yes  Recommendation Provided To: Patient/Caregiver: 1 via Telephone  Intervention Detail: Refill(s) Provided  Intervention Accepted By: Patient/Caregiver: 1   Time Spent (min): 15

## 2023-11-27 ENCOUNTER — OFFICE VISIT (OUTPATIENT)
Age: 33
End: 2023-11-27

## 2023-11-27 ENCOUNTER — TELEPHONE (OUTPATIENT)
Dept: PHARMACY | Age: 33
End: 2023-11-27

## 2023-11-27 VITALS
WEIGHT: 142.4 LBS | HEIGHT: 62 IN | SYSTOLIC BLOOD PRESSURE: 134 MMHG | BODY MASS INDEX: 26.2 KG/M2 | DIASTOLIC BLOOD PRESSURE: 91 MMHG | OXYGEN SATURATION: 98 % | TEMPERATURE: 98.3 F | HEART RATE: 90 BPM

## 2023-11-27 DIAGNOSIS — J40 BRONCHITIS: Primary | ICD-10-CM

## 2023-11-27 RX ORDER — BENZONATATE 200 MG/1
200 CAPSULE ORAL 3 TIMES DAILY PRN
Qty: 30 CAPSULE | Refills: 0 | Status: SHIPPED | OUTPATIENT
Start: 2023-11-27 | End: 2023-12-04

## 2023-11-27 RX ORDER — AZITHROMYCIN 250 MG/1
250 TABLET, FILM COATED ORAL SEE ADMIN INSTRUCTIONS
Qty: 6 TABLET | Refills: 0 | Status: SHIPPED | OUTPATIENT
Start: 2023-11-27 | End: 2023-12-02

## 2023-11-27 ASSESSMENT — ENCOUNTER SYMPTOMS: COUGH: 1

## 2023-11-27 NOTE — TELEPHONE ENCOUNTER
Specialty Medication Service    Date: 11/27/2023  Patient's Name: Jude Hwang YOB: 1990            _____________________________________________________________________________________________    Inbound fax received from external provider containing patient medical records requested by SMS. Patient identifiers confirmed on each page to ensure accuracy and protection of privacy. Imported into the chart media, PharmD aware notes are available for viewing.     Cheryle Michaels, CPhT  Pharmacy   Specialty Medication Services   Phone: [phone] option 4    For Pharmacy Admin Tracking Only    Program: [place]  CPA in place:  Yes  Recommendation Provided To: Provider: 1 via Fax sent to office and Patient/Caregiver: 1 via Telephone  Intervention Detail: Scheduled Appointment  Intervention Accepted By: Provider: 1 and Patient/Caregiver: 1  Gap Closed?:    Time Spent (min): 15
Specialty Medication Service    Date: 11/27/2023  Patient's Name: Terry King YOB: 1990            _____________________________________________________________________________________________    Left message to schedule PharmD follow up appointment for Specialty Medication Services. Please call: [phone] option 4. Will continue to outreach as appropriate.      Chato John CPhT  Pharmacy   Specialty Medication Services   Phone: [phone] option 4
Specialty Medication Service    Date: 11/27/2023  Patient's Name: Terry King YOB: 1990            _____________________________________________________________________________________________    Reached patient to schedule PharmD follow up appointment for Specialty Medication Services. Patient scheduled 11/30/23 . Faxed Dr. Dana Pollock office to request office notes.      Alexus Nielsen PharmD [facility]  Ambulatory Clinical Pharmacist  Specialty Medication Services  Phone: [phone] option #4  Fax: [phone]    For Pharmacy Admin Tracking Only    Program: [facility]  CPA in place:  Yes  Recommendation Provided To: Provider: 1 via Fax sent to office and Patient/Caregiver: 1 via Telephone  Intervention Detail: Scheduled Appointment  Intervention Accepted By: Provider: 0 and Patient/Caregiver: 1    Time Spent (min): 20
[id number]

## 2023-11-30 ENCOUNTER — PHARMACY VISIT (OUTPATIENT)
Dept: PHARMACY | Age: 33
End: 2023-11-30

## 2023-11-30 DIAGNOSIS — L20.9 ATOPIC DERMATITIS, UNSPECIFIED TYPE: Primary | ICD-10-CM

## 2023-11-30 ASSESSMENT — PATIENT HEALTH QUESTIONNAIRE - PHQ9
SUM OF ALL RESPONSES TO PHQ QUESTIONS 1-9: 0
2. FEELING DOWN, DEPRESSED OR HOPELESS: 0
SUM OF ALL RESPONSES TO PHQ9 QUESTIONS 1 & 2: 0
SUM OF ALL RESPONSES TO PHQ QUESTIONS 1-9: 0
1. LITTLE INTEREST OR PLEASURE IN DOING THINGS: 0

## 2023-11-30 NOTE — PROGRESS NOTES
Specialty Medication Service    Patient's Name: Cortney Bauer YOB: 1990      Reason for visit: Cortney Bauer is a 28 y.o. female presenting today for Specialty Medication Service visit follow up. Patient last seen by [facility] 6/8/2023. Patient continues on SMS formulary medication, Dupixent. Pharmacy completed Specialty Medication Service visit for medication monitoring and counseling. Medication list updated. Specialty Medication: Dupixent 300mg/2mL SOSY   Frequency: Every 14 days   Indication: Atopic dermatitis   Initially Diagnosed: childhood   Additional Therapy:   Christiano Britain 1.5% cream PRN  Previous Therapy:   Other topical medications      Specialist:   Kaylynn Nguyen MD  [facility] [address], [address] [place] [address]  P: [phone]  F: [phone]  Specialist Progress Note Available: Yes  Last Specialist Visit:   4/7/2023 - Following-up for Dupixent injections. She has pink/hyperpigmented patches with scales located on trunk diagnosed as tinea versicolor. Has erythematous eczematous patches on arms and hands - well controlled. Continue Dupixent and Opzelura cream. Follow-up in 6 months.      No Known Allergies    Past Medical History:   Diagnosis Date    Eczema     External hemorrhoids     Pregnancy induced hypertension     with 1st pregnancy, no problems with this pregnancy    Pyelonephritis 2011      Social History     Tobacco Use    Smoking status: Never    Smokeless tobacco: Never   Substance Use Topics    Alcohol use: No     Alcohol/week: 4.0 standard drinks of alcohol     Types: 4 Standard drinks or equivalent per week     Family History   Problem Relation Age of Onset    High Blood Pressure Mother     High Blood Pressure Father     High Blood Pressure Maternal Grandfather     Heart Disease Maternal Grandfather     Diabetes Maternal Grandfather     High Cholesterol Maternal Grandfather     Breast Cancer Paternal Grandmother     High Blood Pressure Paternal Grandfather

## 2023-12-01 RX ORDER — CONTAINER,EMPTY
EACH MISCELLANEOUS
Qty: 1 EACH | Refills: 3 | Status: SHIPPED | OUTPATIENT
Start: 2023-12-01

## 2023-12-09 LAB
HPV HR 12 DNA SPEC QL NAA+PROBE: NOT DETECTED
HPV16 DNA SPEC QL NAA+PROBE: DETECTED
HPV16+18+H RISK 12 DNA SPEC-IMP: ABNORMAL
HPV18 DNA SPEC QL NAA+PROBE: NOT DETECTED

## 2024-01-24 ENCOUNTER — TELEPHONE (OUTPATIENT)
Dept: PHARMACY | Age: 34
End: 2024-01-24

## 2024-01-24 NOTE — TELEPHONE ENCOUNTER
Specialty Medication Service    Date: 1/24/2024  Patient's Name: Melissa Melo YOB: 1990            _____________________________________________________________________________________________    Spoke with patient to schedule Medical Director  appointment for Specialty Medication Services. Patient scheduled 02/07/24 at 4 pm.      Radha Lazaro CPhT  Pharmacy   Specialty Medication Services   Phone: [phone] option 4    For Pharmacy Admin Tracking Only    Program: [facility]  CPA in place:  Yes  Recommendation Provided To: Patient/Caregiver: 1 via Telephone  Intervention Detail: Scheduled Appointment  Intervention Accepted By: Patient/Caregiver: 1  Gap Closed?:    Time Spent (min): 15

## 2024-01-29 ENCOUNTER — TELEMEDICINE (OUTPATIENT)
Dept: PRIMARY CARE CLINIC | Age: 34
End: 2024-01-29
Payer: COMMERCIAL

## 2024-01-29 ENCOUNTER — NURSE ONLY (OUTPATIENT)
Dept: FAMILY MEDICINE CLINIC | Age: 34
End: 2024-01-29
Payer: COMMERCIAL

## 2024-01-29 ENCOUNTER — TELEPHONE (OUTPATIENT)
Dept: PRIMARY CARE CLINIC | Age: 34
End: 2024-01-29

## 2024-01-29 DIAGNOSIS — R50.9 FEVER, UNSPECIFIED FEVER CAUSE: ICD-10-CM

## 2024-01-29 DIAGNOSIS — J02.9 PHARYNGITIS, UNSPECIFIED ETIOLOGY: Primary | ICD-10-CM

## 2024-01-29 DIAGNOSIS — J06.9 VIRAL UPPER RESPIRATORY TRACT INFECTION: ICD-10-CM

## 2024-01-29 DIAGNOSIS — J02.9 PHARYNGITIS, UNSPECIFIED ETIOLOGY: ICD-10-CM

## 2024-01-29 LAB
INFLUENZA A ANTIBODY: NEGATIVE
INFLUENZA B ANTIBODY: NEGATIVE
Lab: NORMAL
QC PASS/FAIL: NORMAL
S PYO AG THROAT QL: NORMAL
SARS-COV-2 RDRP RESP QL NAA+PROBE: NEGATIVE

## 2024-01-29 PROCEDURE — 87635 SARS-COV-2 COVID-19 AMP PRB: CPT | Performed by: FAMILY MEDICINE

## 2024-01-29 PROCEDURE — 87880 STREP A ASSAY W/OPTIC: CPT | Performed by: NURSE PRACTITIONER

## 2024-01-29 PROCEDURE — 87804 INFLUENZA ASSAY W/OPTIC: CPT | Performed by: NURSE PRACTITIONER

## 2024-01-29 PROCEDURE — 99213 OFFICE O/P EST LOW 20 MIN: CPT | Performed by: NURSE PRACTITIONER

## 2024-01-29 RX ORDER — LIDOCAINE HYDROCHLORIDE 20 MG/ML
15 SOLUTION OROPHARYNGEAL EVERY 4 HOURS PRN
Qty: 100 ML | Refills: 0 | Status: SHIPPED | OUTPATIENT
Start: 2024-01-29

## 2024-01-29 ASSESSMENT — ENCOUNTER SYMPTOMS
SORE THROAT: 1
CHEST TIGHTNESS: 0
DIARRHEA: 0
VOMITING: 0
WHEEZING: 0
SHORTNESS OF BREATH: 0
COUGH: 1
NAUSEA: 1
TROUBLE SWALLOWING: 1

## 2024-01-29 NOTE — PROGRESS NOTES
[facility] Primary Care Virtualist Encounter Note       Chief Complaint   Patient presents with    Fever    Chills    Pharyngitis    URI       HPI:    Melissa Melo (:  1990) has requested an audio/video evaluation for the following concern(s):    This is an established patient of Dr. Berry. This is the first time I am seeing the patient today. She requested this visit for URI/flu strep type illness she thinks.  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 Symptoms started Friday evening as she left work with sore throat and she did not think much of it at that time. Then after getting home and going to bed she awoke at  around 10:30 PM with full on sore throat, chills, fatigue and general sense of not feeling well. She had slight nausea. The next morning she was so tired she stayed in bed sore throat continued, she has fever highest being 102, trouble swallowing, but does not see white patches at that time and has not looked again. + chills. + PND, slight cough, does not want to eat. Denies SOB/Wheezing. No CP. No V/D. Will send to PCP office for Rapid strep/FLU/COVID/and throat culture. Patient had to call off work today.        Review of Systems   Constitutional:  Positive for chills, fatigue and fever.   HENT:  Positive for congestion, postnasal drip, sore throat and trouble swallowing.    Respiratory:  Positive for cough. Negative for chest tightness, shortness of breath and wheezing.    Cardiovascular: Negative.    Gastrointestinal:  Positive for nausea. Negative for diarrhea and vomiting.   Genitourinary: Negative.    Musculoskeletal:  Positive for myalgias.       Prior to Visit Medications    Medication Sig Taking? Authorizing Provider   sharps container Use to dispose of sharps.  Pushpa Cleveland MD   dupilumab (DUPIXENT) 300 MG/2ML SOSY injection Inject 2 mLs into the skin every 14 days  Pushpa Cleveladn MD   Ruxolitinib Phosphate (OPZELURA) 1.5 % CREA Apply

## 2024-01-29 NOTE — TELEPHONE ENCOUNTER
Pt scheduled at 10:00am at [facility] for POC testing.    Electronically signed by Cyndy Vee MA on 1/29/24 at 9:03 AM EST

## 2024-01-29 NOTE — PATIENT INSTRUCTIONS
Notify office if you do not improve or have worsening of condition.  Take medication as prescribed.   Most consistent with viral illness and antibiotics will not help at this time, however, continue to monitor if no improvement or worsening could develop in to bacterial infection and can be treated with antibiotics.  Drink plenty of fluids and rest.  Practice good hand hygiene.  May use Cepacol lozenges, or chloraseptic spray.  May sleep with humidifier as needed.  Gargle with warm salt water 3-4 times a day to help with sore throat. Warm tea with honey.  You can use ice, warm chicken noodle soup, soft foods, popsicles and cough drops to help soothe your throat.  May take Tylenol/Ibuprofen (alternate) as needed for fever/pain.  Do not eat or drink after anyone.   Do not share utensils.  Cover your mouth and nose when you cough or sneeze.  Follow up with PCP in 3-4 days if not improving or sooner if worsening.  Please refer to educational handout with AVS.    If COVID Positive:  Would be candidate for Paxlovid.    Currently, the CDC recommends staying at home in self isolation for at least 5 days. After that, if you are without a fever and symptoms are improving, you may go out, but only if wearing a mask for an additional 5 days.     Thankfully, most people recover uneventfully. The mainstay of treatment for most people remains focused on symptom control, isolating and watching for signs of worsening illness. You should drink plenty of fluids, eat when you are able, and rest as much as possible.    Recommend treating symptoms with OTC medications: Flonase for congestions, Mucinex for Phlegm, Robitussin DM or Delsym for cough, Tylenol/Ibuprofen for fever/body aches.    Recommend Vitamin D, C and Zinc.    We do not prescribe antibiotics for viral illnesses; however, we can treat secondary infections should the need arise.    Please seek more urgent medical attention if you develop any of the following:  Chest

## 2024-01-31 DIAGNOSIS — B95.0 GROUP A STREPTOCOCCAL INFECTION: Primary | ICD-10-CM

## 2024-01-31 LAB
BACTERIA THROAT AEROBE CULT: ABNORMAL
BACTERIA THROAT AEROBE CULT: ABNORMAL
ORGANISM: ABNORMAL

## 2024-01-31 RX ORDER — AMOXICILLIN 500 MG/1
500 CAPSULE ORAL 2 TIMES DAILY
Qty: 20 CAPSULE | Refills: 0 | Status: SHIPPED | OUTPATIENT
Start: 2024-01-31 | End: 2024-02-10

## 2024-01-31 NOTE — PROGRESS NOTES
Throat culture positive for Group A strep  Called patient and let her know results  Called in Amoxicillin 500 mg BID X 10 days  She verbalizes understanding of results and plan of care  She was instructed to f/u with PCP if symptoms do not improve or worsen

## 2024-02-07 ENCOUNTER — PHARMACY VISIT (OUTPATIENT)
Dept: PHARMACY | Age: 34
End: 2024-02-07

## 2024-02-07 DIAGNOSIS — L20.9 ATOPIC DERMATITIS, UNSPECIFIED TYPE: Primary | ICD-10-CM

## 2024-02-26 NOTE — PROGRESS NOTES
Covid testing to be done:    If positive---Pt instructed to notify MD ASAP  If negative--pt was instructed to bring Hard copy of Covid results DOP/DOS    Preoperative Screening for Elective Surgery/Invasive Procedures While COVID-19 present in the community     Have you tested positive or have been told to self-isolate for COVID-19 like symptoms within the past 28 days?  Do you currently have any of the following symptoms?  Fever >100.0 F or 99.9 F in immunocompromised patients?  New onset cough, shortness of breath or difficulty breathing?  New onset sore throat, myalgia (muscle aches and pains), headache, loss of taste/smell or diarrhea?  Have you had a potential exposure to COVID-19 within the past 14 days by:  Close contact with a confirmed case?  Close contact with a healthcare worker,  or essential infrastructure worker (grocery store, restaurant, gas station, public utilities or transportation)?  Do you reside in a congregate setting such as; skilled nursing facility, adult home, correctional facility, homeless shelter or other institutional setting?  Have you had recent travel to a known COVID-19 hotspot?     Indicate if the patient has a positive screen by answering yes to one or more of the above questions.    If patient is unable to obtain a COVID test prior to DOS/DOP will need RAPID DOS.C-Difficile admission screening and protocol:       * Admitted with diarrhea?                         [] YES    [x]  NO     *Prior history of C-Diff. In last 3 months? [] YES    [x]  NO     *Antibiotic use in the past 6-8 weeks?      [x]  NO    []  YES      If yes, which: REASON_________________     *Prior hospitalization or nursing home in the last month? []  YES    [x]  NO     SAFETY FIRST..call before you fall    [facility] PRE-OPERATIVE INSTRUCTIONS    Day of Surgery:Arrival time_4/16/24 @1130_______ Surgery time_1230___________    Do not eat or drink anything after 12:00 midnight prior to

## 2024-02-26 NOTE — PROGRESS NOTES
Follow Up Prior to Surgery    DOS: 3/15/24  :1990      History and Physical:        Missing Orders:  Patient to see Dr. Sloan on 24.         Cheryl Fang:                                      Surgeon's Name:

## 2024-02-26 NOTE — PROGRESS NOTES
[facility] Pre-Admission Testing Electronic Communication Worksheet for OR/ENDO Procedures        Patient: Melissa Melo    DOS: 4/16/24    Arrival Time: 1130    Surgery Time: 1230    Meds to Bed:  [] YES    [x]  NO    Transportation Confirmed: [x] YES    []  NO    History and Physical:  [] YES    [x]  NO  [] N/A  If yes, please list doctor or Urgent Care and date of H&P:     Additional Clearance(Cardiac, Pulmonary, etc):  [] YES    [x]  NO    Pre-Admission Testing Visit:  [] YES    [x]  NO If no, do labs/testing need to be done DOS?  [] YES    []  NO  UNKNOWN    Medication Reconciliation Complete:  [x] YES    []  NO        Additional Notes:                Interview Complete: [x] YES    []  NO          Lindsey Kolb RN  12:20 PM

## 2024-03-12 ENCOUNTER — TELEPHONE (OUTPATIENT)
Dept: PHARMACY | Age: 34
End: 2024-03-12

## 2024-03-12 DIAGNOSIS — L20.9 ATOPIC DERMATITIS, UNSPECIFIED TYPE: ICD-10-CM

## 2024-03-12 NOTE — TELEPHONE ENCOUNTER
Specialty Medication Service    Date: 3/12/2024  Patient's Name: Melissa Melo YOB: 1990            _____________________________________________________________________________________________    Melissa Melo is a 33 y.o. female enrolled in the Specialty Medication Service.     We received a refill request for Dupixent on 03/12/24.     Original Rx was written for 4 fills on 03/12/24.     Thank you,    Lupe Lazaro      Requested Prescriptions     Pending Prescriptions Disp Refills    dupilumab (DUPIXENT) 300 MG/2ML SOSY injection 4 mL 3     Sig: Inject 2 mLs into the skin every 14 days

## 2024-03-13 RX ORDER — DUPILUMAB 300 MG/2ML
300 INJECTION, SOLUTION SUBCUTANEOUS
Qty: 4 ML | Refills: 3 | Status: SHIPPED | OUTPATIENT
Start: 2024-03-13

## 2024-03-13 NOTE — TELEPHONE ENCOUNTER
CLINICAL PHARMACY NOTE - SPECIALTY MEDICATION SERVICE      Melissa Melo is a 33 y.o.  female enrolled in the Specialty Medication Service. Chart reviewed and patient is compliant with SMS program requirements. Patient does have a signed CPA on file. Next SMS visit is due in May 2024.       No labs required for Dupixent monitoring. Last health maintenance labs were completed 3/31/2023. No significant concerns noted. Patient's last visit notes are from March 2023 with instructions to follow-up in 6 months. Will fax office for most recent chart notes.      Will approve refill for #4mL + 3 refills per original specialist's order (Dr. Estrada Patterson) on 3/12/2024.      Fidelia Salazar PharmD  Ambulatory Clinical Pharmacist   Specialty Medication Services   Phone: [phone] option 4  3/13/2024 9:36 AM      For Pharmacy Admin Tracking Only    Program: [facility]  CPA in place:  Yes  Recommendation Provided To: Patient/Caregiver: 1 via Telephone  Intervention Detail: Refill(s) Provided  Intervention Accepted By: Patient/Caregiver: 1   Time Spent (min): 15

## 2024-04-16 ENCOUNTER — HOSPITAL ENCOUNTER (OUTPATIENT)
Age: 34
Setting detail: OUTPATIENT SURGERY
Discharge: HOME | End: 2024-04-16
Attending: OBSTETRICS & GYNECOLOGY | Admitting: OBSTETRICS & GYNECOLOGY
Payer: COMMERCIAL

## 2024-04-16 VITALS
HEART RATE: 98 BPM | SYSTOLIC BLOOD PRESSURE: 130 MMHG | DIASTOLIC BLOOD PRESSURE: 77 MMHG | TEMPERATURE: 98.1 F | BODY MASS INDEX: 25.32 KG/M2 | RESPIRATION RATE: 16 BRPM | HEIGHT: 63 IN | OXYGEN SATURATION: 98 % | WEIGHT: 142.9 LBS

## 2024-04-16 DIAGNOSIS — R87.613 HIGH GRADE SQUAMOUS INTRAEPITHELIAL LESION ON CYTOLOGIC SMEAR OF CERVIX (HGSIL): ICD-10-CM

## 2024-04-16 LAB — HCG UR QL: NEGATIVE

## 2024-04-16 PROCEDURE — 2709999900 HC NON-CHARGEABLE SUPPLY: Performed by: OBSTETRICS & GYNECOLOGY

## 2024-04-16 PROCEDURE — 84703 CHORIONIC GONADOTROPIN ASSAY: CPT

## 2024-04-16 PROCEDURE — 3600000015 HC SURGERY LEVEL 5 ADDTL 15MIN: Performed by: OBSTETRICS & GYNECOLOGY

## 2024-04-16 PROCEDURE — 88305 TISSUE EXAM BY PATHOLOGIST: CPT

## 2024-04-16 PROCEDURE — 2500000003 HC RX 250 WO HCPCS: Performed by: OBSTETRICS & GYNECOLOGY

## 2024-04-16 PROCEDURE — 3600000005 HC SURGERY LEVEL 5 BASE: Performed by: OBSTETRICS & GYNECOLOGY

## 2024-04-16 PROCEDURE — 7100000010 HC PHASE II RECOVERY - FIRST 15 MIN: Performed by: OBSTETRICS & GYNECOLOGY

## 2024-04-16 PROCEDURE — 88342 IMHCHEM/IMCYTCHM 1ST ANTB: CPT

## 2024-04-16 RX ORDER — LIDOCAINE HYDROCHLORIDE AND EPINEPHRINE 10; 10 MG/ML; UG/ML
INJECTION, SOLUTION INFILTRATION; PERINEURAL
Status: COMPLETED | OUTPATIENT
Start: 2024-04-16 | End: 2024-04-16

## 2024-04-16 RX ORDER — LIDOCAINE HYDROCHLORIDE 10 MG/ML
INJECTION, SOLUTION EPIDURAL; INFILTRATION; INTRACAUDAL; PERINEURAL
Status: COMPLETED | OUTPATIENT
Start: 2024-04-16 | End: 2024-04-16

## 2024-04-16 RX ORDER — IBUPROFEN 600 MG/1
600 TABLET ORAL EVERY 8 HOURS PRN
Status: CANCELLED | OUTPATIENT
Start: 2024-04-16

## 2024-04-16 ASSESSMENT — PAIN - FUNCTIONAL ASSESSMENT
PAIN_FUNCTIONAL_ASSESSMENT: 0-10
PAIN_FUNCTIONAL_ASSESSMENT: NONE - DENIES PAIN
PAIN_FUNCTIONAL_ASSESSMENT: 0-10

## 2024-04-16 ASSESSMENT — PAIN SCALES - GENERAL: PAINLEVEL_OUTOF10: 0

## 2024-04-16 NOTE — OP NOTE
[facility]          [address] [place] [address]                            OPERATIVE REPORT      PATIENT NAME: CHUCK LO              : 1990  MED REC NO: [id number]                      ROOM: OR  ACCOUNT NO: [id number]                       ADMIT DATE: 2024  PROVIDER: Bryan Sloan MD      DATE OF PROCEDURE:  2024    SURGEON:  Bryan Sloan MD    PREOPERATIVE DIAGNOSIS:  High-grade squamous intraepithelial lesion.    POSTOPERATIVE DIAGNOSIS:  High-grade squamous intraepithelial lesion.    PROCEDURE:  Loop electrosurgical excision procedure of the cervix.    ANESTHESIA:  Paracervical block.    COMPLICATIONS:  None.    FINDINGS:  Good hemostasis postop.    INDICATIONS:  This is a 33-year-old who was found to have colposcopic-directed biopsies of HGSIL.  Options were discussed.  Risks and benefits reviewed, including cervical stenosis and cervical incompetence.  The patient is requesting surgery at this time.    DESCRIPTION OF PROCEDURE:  The patient was taken into the procedure room and placed in the dorsal lithotomy position.  [place] speculum was placed and the cervix was visualized.  The transformation zone was identified and a paracervical block was performed with 20 mL of 1% lidocaine with epinephrine and 8 mL of 1% lidocaine.  When the paracervical block was set, the procedure was performed on the left side of the cervix from the 12 o'clock to the 6 o'clock position and then on the opposite side from the 12 o'clock to the 6 o'clock position.  Specimen was sent for further pathologic diagnosis.  Minimal bleeding was noted and the cervix was cauterized and Monsel's was placed.  The procedure was ended at this point, all counts were correct.  The patient was taken to recovery room in good condition.        BRYAN SLOAN MD    D:  2024 13:06:05     T:  2024 18:17:27     GM/NICOLE  Job #:  [id number]     Doc#:

## 2024-04-16 NOTE — H&P
[facility] Quality Flow/Interdisciplinary Rounds Progress Note        Quality Flow Rounds held on May 4, 2019    Disciplines Attending:  Bedside Nurse, Charge nurse, MOISES, OT, PT, , and . Sandeep Orr was admitted on 5/1/2019  4:58 PM    Anticipated Discharge Date:  Expected Discharge Date: 05/09/19    Disposition:    Dylan Score:  Dylan Scale Score: 15    Readmission Risk              Risk of Unplanned Readmission:        32           Discussed patient goal for the day, patient clinical progression, and barriers to discharge. The following Goal(s) of the Day/Commitment(s) have been identified:  Okay to transfer out. If IR procedure not today patient to begin eating and diet started.        Ivonne Gonzalez  May 4, 2019 H&P REVIEWED NO CHANGES

## 2024-04-16 NOTE — DISCHARGE SUMMARY
Department of Obstetrics and Gynecology  Gynecologic Discharge Summary      Admit Date: 4/16/2024    Admit Diagnosis: High grade squamous intraepithelial lesion on cytologic smear of cervix (HGSIL) [R87.613]    Discharge Date: 4/16/24    Discharge Diagnoses: same       Medication List        CONTINUE taking these medications      sharps container  Use to dispose of sharps.            ASK your doctor about these medications      Dupixent 300 MG/2ML Sosy injection  Generic drug: dupilumab  Inject 2 mLs into the skin every 14 days     lidocaine viscous hcl 2 % Soln solution  Commonly known as: XYLOCAINE  Take 15 mLs by mouth every 4 hours as needed for Irritation (throat pain) Gargle and spit.     Opzelura 1.5 % Crea  Generic drug: Ruxolitinib Phosphate              Service: Gynecology    Consults: none    Significant Diagnostic Studies: none    Treatments: surgery: LEEP of cervix    Condition at Discharge: good    Hospital Course: uncomplicated    Discharge Instructions:    Activity: as tolerated    Diet: regular diet    Instructions: No driving while using pain medications, no heavy lifting, vaginal rest for 2 weeks, keep incision clean and dry, call office if have fever>100.4 F, inability to void for >6 h, severe abdominal pain, vaginal bleeding, or constipation.       Discharge to: Home    Disposition / Followup: Return to office in 2 weeks    Electronically signed by BRYAN RICHARDSON MD on 4/16/2024 at 1:01 PM

## 2024-05-02 ENCOUNTER — TELEPHONE (OUTPATIENT)
Dept: PHARMACY | Age: 34
End: 2024-05-02

## 2024-05-02 NOTE — TELEPHONE ENCOUNTER
Specialty Medication Service    Date: 5/2/2024  Patient's Name: Melissa Melo YOB: 1990            _____________________________________________________________________________________________    Patient returned call to schedule PharmD follow up appointment for Specialty Medication Services. Patient scheduled 05/09/24 at 930.      Radha Lazaro CPhT  Pharmacy   Specialty Medication Services   Phone: [phone] option 4    For Pharmacy Admin Tracking Only    Program: [facility]  CPA in place:  Yes  Recommendation Provided To: Patient/Caregiver: 1 via Telephone  Intervention Detail: Scheduled Appointment  Intervention Accepted By: Patient/Caregiver: 1  Gap Closed?:    Time Spent (min): 15

## 2024-05-09 ENCOUNTER — PHARMACY VISIT (OUTPATIENT)
Dept: PHARMACY | Age: 34
End: 2024-05-09

## 2024-05-09 DIAGNOSIS — L20.9 ATOPIC DERMATITIS, UNSPECIFIED TYPE: Primary | ICD-10-CM

## 2024-05-09 ASSESSMENT — PROMIS GLOBAL HEALTH SCALE
TO WHAT EXTENT ARE YOU ABLE TO CARRY OUT YOUR EVERYDAY PHYSICAL ACTIVITIES SUCH AS WALKING, CLIMBING STAIRS, CARRYING GROCERIES, OR MOVING A CHAIR [ON A SCALE OF 1 (NOT AT ALL) TO 5 (COMPLETELY)]?: COMPLETELY
IN GENERAL, WOULD YOU SAY YOUR QUALITY OF LIFE IS...[ON A SCALE OF 1 (POOR) TO 5 (EXCELLENT)]: EXCELLENT
IN GENERAL, HOW WOULD YOU RATE YOUR PHYSICAL HEALTH [ON A SCALE OF 1 (POOR) TO 5 (EXCELLENT)]?: EXCELLENT
SUM OF RESPONSES TO QUESTIONS 3, 6, 7, & 8: 14
IN GENERAL, WOULD YOU SAY YOUR HEALTH IS...[ON A SCALE OF 1 (POOR) TO 5 (EXCELLENT)]: GOOD
SUM OF RESPONSES TO QUESTIONS 2, 4, 5, & 10: 20
IN GENERAL, HOW WOULD YOU RATE YOUR MENTAL HEALTH, INCLUDING YOUR MOOD AND YOUR ABILITY TO THINK [ON A SCALE OF 1 (POOR) TO 5 (EXCELLENT)]?: EXCELLENT
IN GENERAL, PLEASE RATE HOW WELL YOU CARRY OUT YOUR USUAL SOCIAL ACTIVITIES (INCLUDES ACTIVITIES AT HOME, AT WORK, AND IN YOUR COMMUNITY, AND RESPONSIBILITIES AS A PARENT, CHILD, SPOUSE, EMPLOYEE, FRIEND, ETC) [ON A SCALE OF 1 (POOR) TO 5 (EXCELLENT)]?: EXCELLENT
IN THE PAST 7 DAYS, HOW WOULD YOU RATE YOUR PAIN ON AVERAGE [ON A SCALE FROM 0 (NO PAIN) TO 10 (WORST IMAGINABLE PAIN)]?: 0 NO PAIN
IN THE PAST 7 DAYS, HOW WOULD YOU RATE YOUR FATIGUE ON AVERAGE [ON A SCALE FROM 1 (NONE) TO 5 (VERY SEVERE)]?: MILD
IN GENERAL, HOW WOULD YOU RATE YOUR SATISFACTION WITH YOUR SOCIAL ACTIVITIES AND RELATIONSHIPS [ON A SCALE OF 1 (POOR) TO 5 (EXCELLENT)]?: EXCELLENT
IN THE PAST 7 DAYS, HOW OFTEN HAVE YOU BEEN BOTHERED BY EMOTIONAL PROBLEMS, SUCH AS FEELING ANXIOUS, DEPRESSED, OR IRRITABLE [ON A SCALE FROM 1 (NEVER) TO 5 (ALWAYS)]?: NEVER

## 2024-05-09 ASSESSMENT — PATIENT HEALTH QUESTIONNAIRE - PHQ9
SUM OF ALL RESPONSES TO PHQ9 QUESTIONS 1 & 2: 0
SUM OF ALL RESPONSES TO PHQ QUESTIONS 1-9: 0
2. FEELING DOWN, DEPRESSED OR HOPELESS: NOT AT ALL
SUM OF ALL RESPONSES TO PHQ QUESTIONS 1-9: 0
1. LITTLE INTEREST OR PLEASURE IN DOING THINGS: NOT AT ALL

## 2024-05-09 NOTE — PROGRESS NOTES
months or call with any questions or concerns.      Fidelia Salazar PharmD  Ambulatory Clinical Pharmacist   Specialty Medication Services   Phone: [phone] option 4  5/9/2024 10:10 AM    For Pharmacy Admin Tracking Only    Program: [facility]  CPA in place:  Yes  Time Spent (min): 60    Melissa Melo was evaluated through a synchronous (real-time) audio encounter. Patient identification was verified at the start of the visit. She (or guardian if applicable) is aware that this is a billable service, which includes applicable co-pays. This visit was conducted with the patient's (and/or legal guardian's) verbal consent. She has not had a related appointment within my department in the past 7 days or scheduled within the next 24 hours.   The patient was located at Home: [address] [place] [address].  The provider was located at Home (Appt Dept State): [place].

## 2024-05-24 ENCOUNTER — HOSPITAL ENCOUNTER (OUTPATIENT)
Dept: ULTRASOUND IMAGING | Age: 34
Discharge: HOME | End: 2024-05-24
Attending: OBSTETRICS & GYNECOLOGY
Payer: COMMERCIAL

## 2024-05-24 DIAGNOSIS — D06.9 ADENOCARCINOMA IN SITU OF CERVIX: ICD-10-CM

## 2024-05-24 PROCEDURE — 76830 TRANSVAGINAL US NON-OB: CPT

## 2024-05-24 PROCEDURE — 76856 US EXAM PELVIC COMPLETE: CPT

## 2024-05-30 ENCOUNTER — OFFICE VISIT (OUTPATIENT)
Dept: FAMILY MEDICINE CLINIC | Age: 34
End: 2024-05-30
Payer: COMMERCIAL

## 2024-05-30 VITALS
OXYGEN SATURATION: 96 % | BODY MASS INDEX: 25.55 KG/M2 | WEIGHT: 144.2 LBS | TEMPERATURE: 97.8 F | DIASTOLIC BLOOD PRESSURE: 82 MMHG | SYSTOLIC BLOOD PRESSURE: 108 MMHG | HEIGHT: 63 IN | HEART RATE: 89 BPM | RESPIRATION RATE: 16 BRPM

## 2024-05-30 DIAGNOSIS — Z01.810 PREOPERATIVE CARDIOVASCULAR EXAMINATION: Primary | ICD-10-CM

## 2024-05-30 DIAGNOSIS — Z01.810 PREOPERATIVE CARDIOVASCULAR EXAMINATION: ICD-10-CM

## 2024-05-30 PROBLEM — R19.8 ABDOMINAL WEAKNESS: Status: RESOLVED | Noted: 2017-05-15 | Resolved: 2024-05-30

## 2024-05-30 PROBLEM — R06.2 WHEEZING: Status: RESOLVED | Noted: 2021-08-13 | Resolved: 2024-05-30

## 2024-05-30 PROCEDURE — 99213 OFFICE O/P EST LOW 20 MIN: CPT | Performed by: FAMILY MEDICINE

## 2024-05-30 SDOH — ECONOMIC STABILITY: FOOD INSECURITY: WITHIN THE PAST 12 MONTHS, YOU WORRIED THAT YOUR FOOD WOULD RUN OUT BEFORE YOU GOT MONEY TO BUY MORE.: NEVER TRUE

## 2024-05-30 SDOH — ECONOMIC STABILITY: FOOD INSECURITY: WITHIN THE PAST 12 MONTHS, THE FOOD YOU BOUGHT JUST DIDN'T LAST AND YOU DIDN'T HAVE MONEY TO GET MORE.: NEVER TRUE

## 2024-05-30 NOTE — PROGRESS NOTES
Occupation: speech therapist   Tobacco Use    Smoking status: Never    Smokeless tobacco: Never   Vaping Use    Vaping Use: Never used   Substance and Sexual Activity    Alcohol use: Yes     Alcohol/week: 4.0 standard drinks of alcohol     Types: 4 Standard drinks or equivalent per week     Comment: socailly    Drug use: No    Sexual activity: Yes     Partners: Male     Birth control/protection: OCP   Other Topics Concern    Not on file   Social History Narrative    Not on file     Social Determinants of Health     Financial Resource Strain: Low Risk  (5/30/2024)    Overall Financial Resource Strain (CARDIA)     Difficulty of Paying Living Expenses: Not hard at all   Food Insecurity: No Food Insecurity (5/30/2024)    Hunger Vital Sign     Worried About Running Out of Food in the Last Year: Never true     Ran Out of Food in the Last Year: Never true   Transportation Needs: Unknown (5/30/2024)    PRAPARE - Transportation     Lack of Transportation (Medical): Not on file     Lack of Transportation (Non-Medical): No   Physical Activity: Not on file   Stress: Not on file   Social Connections: Not on file   Intimate Partner Violence: Not on file   Housing Stability: Unknown (5/30/2024)    Housing Stability Vital Sign     Unable to Pay for Housing in the Last Year: Not on file     Number of Places Lived in the Last Year: Not on file     Unstable Housing in the Last Year: No       OBJECTIVE:  /82   Pulse 89   Temp 97.8 °F (36.6 °C) (Temporal)   Resp 16   Ht 1.6 m (5' 3\")   Wt 65.4 kg (144 lb 3.2 oz)   SpO2 96%   BMI 25.54 kg/m²      Physical exam:  afebrile, vitals reviewed  Gen:  WD, WN, NAD, A&Ox3, pleasant  Eyes:  Sclerae clear  Neck:  Supple, No cervical or submandibular LAD. No obvious thyromegaly.  Heart:  RRR, no murmur, rubs, gallops  Lungs:  CTAB, no W/R/R  Abd:  soft, NT/ND  Skin: No obvious rashes      ASSESSMENT/PLAN:  1. Preoperative cardiovascular examination  Low risk patient undergoing low

## 2024-05-31 LAB
CREAT SERPL-MCNC: 0.8 MG/DL (ref 0.6–1.1)
GFR SERPLBLD CREATININE-BSD FMLA CKD-EPI: >90 ML/MIN/{1.73_M2}
GLUCOSE SERPL-MCNC: 102 MG/DL (ref 70–99)

## 2024-06-11 NOTE — PROGRESS NOTES
[facility] PRE-SURGICAL TESTING INSTRUCTIONS                      PRIOR TO PROCEDURE DATE:    1. PLEASE FOLLOW ANY INSTRUCTIONS GIVEN TO YOU PER YOUR SURGEON.      2. Arrange for someone to drive you home and be with you for the first 24 hours after discharge for your safety after your procedure for which you received sedation. Ensure it is someone we can share information with regarding your discharge.     NOTE: At this time ONLY 2 ADULTS may accompany you   One person ENCOURAGED to stay at hospital entire time if outpatient surgery      3. You must contact your surgeon for instructions IF:  You are taking any blood thinners, aspirin, anti-inflammatory or vitamins.  There is a change in your physical condition such as a cold, fever, rash, cuts, sores, or any other infection, especially near your surgical site.    4. Do not drink alcohol the day before or day of your procedure.  Do not use any recreational marijuana at least 24 hours or street drugs (heroin, cocaine) at minimum 5 days prior to your procedure.     5. A Pre-Surgical History and Physical MUST be completed WITHIN 30 DAYS OR LESS prior to your procedure.by your Physician or an Urgent Care        THE DAY OF YOUR PROCEDURE:  1.  Follow instructions for ARRIVAL TIME as DIRECTED BY YOUR SURGEON.     2. Enter the MAIN entrance from [facility] and follow the signs to the free Parking Garage or  Parking (offered free of charge 7 am-5pm).      3. Enter the Main Entrance of the hospital (do not enter from the lower level of the parking garage). Upon entrance, check in with the  at the surgical information desk on your LEFT.   Bring your insurance card and photo ID to register      4. DO NOT EAT ANYTHING 8 hours prior to arrival for surgery.  You may have up to 8 ounces of water 4 hours prior to your arrival for surgery.   NOTE: ALL Gastric, Bariatric & Bowel surgery patients - you MUST follow your surgeon's instructions regarding  drowsiness, changes in your vital signs or breathing patterns. Nausea, headache, muscle aches, or sore throat may also occur after anesthesia.  Your nurse will help you manage these potential side effects.    2. For comfort and safety, arrange to have someone at home with you for the first 24 hours after discharge.    3. You and your family will be given written instructions about your diet, activity, dressing care, medications, and return visits.     4. Once at home, should issues with nausea, pain, or bleeding occur, or should you notice any signs of infection, you should call your surgeon.    5. Always clean your hands before and after caring for your wound. Do not let your family touch your surgery site without cleaning their hands.     6. Narcotic pain medications can cause significant constipation.  You may want to add a stool softener to your postoperative medication schedule or speak to your surgeon on how best to manage this SIDE EFFECT.    SPECIAL INSTRUCTIONS     Thank you for allowing us to care for you.  We strive to exceed your expectations in the delivery of care and service provided to you and your family.     If you need to contact the Pre-Admission Testing staff for any reason, please call us at [phone]    Instructions reviewed with patient during preadmission testing phone interview.  Gayla Deluna RN.6/11/2024 .1:57 PM      ADDITIONAL EDUCATIONAL INFORMATION REVIEWED PER PHONE WITH YOU AND/OR YOUR FAMILY:  No Hibiclens® Bathing Instructions   Yes Antibacterial Soap

## 2024-06-19 ENCOUNTER — ANESTHESIA EVENT (OUTPATIENT)
Dept: OPERATING ROOM | Age: 34
End: 2024-06-19
Payer: COMMERCIAL

## 2024-06-19 ENCOUNTER — ANESTHESIA (OUTPATIENT)
Dept: OPERATING ROOM | Age: 34
End: 2024-06-19
Payer: COMMERCIAL

## 2024-06-19 ENCOUNTER — HOSPITAL ENCOUNTER (OUTPATIENT)
Age: 34
Setting detail: OBSERVATION
Discharge: HOME | End: 2024-06-20
Attending: OBSTETRICS & GYNECOLOGY | Admitting: OBSTETRICS & GYNECOLOGY
Payer: COMMERCIAL

## 2024-06-19 DIAGNOSIS — Z90.710 S/P LAPAROSCOPIC HYSTERECTOMY: Primary | ICD-10-CM

## 2024-06-19 DIAGNOSIS — C53.9 MALIGNANT NEOPLASM OF CERVIX, UNSPECIFIED SITE (HCC): ICD-10-CM

## 2024-06-19 LAB
ABO + RH BLD: NORMAL
BLD GP AB SCN SERPL QL: NORMAL
HCG UR QL: NEGATIVE

## 2024-06-19 PROCEDURE — 88112 CYTOPATH CELL ENHANCE TECH: CPT

## 2024-06-19 PROCEDURE — 86900 BLOOD TYPING SEROLOGIC ABO: CPT

## 2024-06-19 PROCEDURE — 2580000003 HC RX 258: Performed by: OBSTETRICS & GYNECOLOGY

## 2024-06-19 PROCEDURE — 3700000001 HC ADD 15 MINUTES (ANESTHESIA): Performed by: OBSTETRICS & GYNECOLOGY

## 2024-06-19 PROCEDURE — 3700000000 HC ANESTHESIA ATTENDED CARE: Performed by: OBSTETRICS & GYNECOLOGY

## 2024-06-19 PROCEDURE — 2580000003 HC RX 258: Performed by: ANESTHESIOLOGY

## 2024-06-19 PROCEDURE — 88305 TISSUE EXAM BY PATHOLOGIST: CPT

## 2024-06-19 PROCEDURE — 6360000002 HC RX W HCPCS: Performed by: OBSTETRICS & GYNECOLOGY

## 2024-06-19 PROCEDURE — 86901 BLOOD TYPING SEROLOGIC RH(D): CPT

## 2024-06-19 PROCEDURE — 2580000003 HC RX 258: Performed by: NURSE ANESTHETIST, CERTIFIED REGISTERED

## 2024-06-19 PROCEDURE — 7100000000 HC PACU RECOVERY - FIRST 15 MIN: Performed by: OBSTETRICS & GYNECOLOGY

## 2024-06-19 PROCEDURE — 6360000002 HC RX W HCPCS: Performed by: NURSE ANESTHETIST, CERTIFIED REGISTERED

## 2024-06-19 PROCEDURE — 88309 TISSUE EXAM BY PATHOLOGIST: CPT

## 2024-06-19 PROCEDURE — 84703 CHORIONIC GONADOTROPIN ASSAY: CPT

## 2024-06-19 PROCEDURE — 6360000002 HC RX W HCPCS: Performed by: ANESTHESIOLOGY

## 2024-06-19 PROCEDURE — S2900 ROBOTIC SURGICAL SYSTEM: HCPCS | Performed by: OBSTETRICS & GYNECOLOGY

## 2024-06-19 PROCEDURE — 6370000000 HC RX 637 (ALT 250 FOR IP): Performed by: ANESTHESIOLOGY

## 2024-06-19 PROCEDURE — G0378 HOSPITAL OBSERVATION PER HR: HCPCS

## 2024-06-19 PROCEDURE — 6370000000 HC RX 637 (ALT 250 FOR IP): Performed by: OBSTETRICS & GYNECOLOGY

## 2024-06-19 PROCEDURE — 2500000003 HC RX 250 WO HCPCS: Performed by: NURSE ANESTHETIST, CERTIFIED REGISTERED

## 2024-06-19 PROCEDURE — 86850 RBC ANTIBODY SCREEN: CPT

## 2024-06-19 PROCEDURE — 3600000019 HC SURGERY ROBOT ADDTL 15MIN: Performed by: OBSTETRICS & GYNECOLOGY

## 2024-06-19 PROCEDURE — 7100000001 HC PACU RECOVERY - ADDTL 15 MIN: Performed by: OBSTETRICS & GYNECOLOGY

## 2024-06-19 PROCEDURE — 2709999900 HC NON-CHARGEABLE SUPPLY: Performed by: OBSTETRICS & GYNECOLOGY

## 2024-06-19 PROCEDURE — 3600000009 HC SURGERY ROBOT BASE: Performed by: OBSTETRICS & GYNECOLOGY

## 2024-06-19 RX ORDER — HYDROMORPHONE HYDROCHLORIDE 1 MG/ML
0.25 INJECTION, SOLUTION INTRAMUSCULAR; INTRAVENOUS; SUBCUTANEOUS EVERY 5 MIN PRN
Status: DISCONTINUED | OUTPATIENT
Start: 2024-06-19 | End: 2024-06-19 | Stop reason: HOSPADM

## 2024-06-19 RX ORDER — METOCLOPRAMIDE HYDROCHLORIDE 5 MG/ML
5 INJECTION INTRAMUSCULAR; INTRAVENOUS EVERY 6 HOURS PRN
Status: DISCONTINUED | OUTPATIENT
Start: 2024-06-19 | End: 2024-06-20 | Stop reason: HOSPADM

## 2024-06-19 RX ORDER — BUPIVACAINE HYDROCHLORIDE 2.5 MG/ML
INJECTION, SOLUTION EPIDURAL; INFILTRATION; INTRACAUDAL PRN
Status: DISCONTINUED | OUTPATIENT
Start: 2024-06-19 | End: 2024-06-19 | Stop reason: HOSPADM

## 2024-06-19 RX ORDER — IBUPROFEN 200 MG
800 TABLET ORAL EVERY 8 HOURS
Status: DISCONTINUED | OUTPATIENT
Start: 2024-06-20 | End: 2024-06-20 | Stop reason: HOSPADM

## 2024-06-19 RX ORDER — MIDAZOLAM HYDROCHLORIDE 1 MG/ML
INJECTION INTRAMUSCULAR; INTRAVENOUS PRN
Status: DISCONTINUED | OUTPATIENT
Start: 2024-06-19 | End: 2024-06-19 | Stop reason: SDUPTHER

## 2024-06-19 RX ORDER — MORPHINE SULFATE 2 MG/ML
2 INJECTION, SOLUTION INTRAMUSCULAR; INTRAVENOUS
Status: DISCONTINUED | OUTPATIENT
Start: 2024-06-19 | End: 2024-06-20 | Stop reason: HOSPADM

## 2024-06-19 RX ORDER — DIPHENHYDRAMINE HYDROCHLORIDE 50 MG/ML
12.5 INJECTION INTRAMUSCULAR; INTRAVENOUS EVERY 6 HOURS PRN
Status: DISCONTINUED | OUTPATIENT
Start: 2024-06-19 | End: 2024-06-20 | Stop reason: HOSPADM

## 2024-06-19 RX ORDER — SCOLOPAMINE TRANSDERMAL SYSTEM 1 MG/1
1 PATCH, EXTENDED RELEASE TRANSDERMAL
Status: DISCONTINUED | OUTPATIENT
Start: 2024-06-19 | End: 2024-06-19 | Stop reason: HOSPADM

## 2024-06-19 RX ORDER — DOCUSATE SODIUM 100 MG/1
100 CAPSULE, LIQUID FILLED ORAL 2 TIMES DAILY
Qty: 60 CAPSULE | Refills: 1 | Status: SHIPPED | OUTPATIENT
Start: 2024-06-19

## 2024-06-19 RX ORDER — PROPOFOL 10 MG/ML
INJECTION, EMULSION INTRAVENOUS PRN
Status: DISCONTINUED | OUTPATIENT
Start: 2024-06-19 | End: 2024-06-19 | Stop reason: SDUPTHER

## 2024-06-19 RX ORDER — ONDANSETRON 2 MG/ML
4 INJECTION INTRAMUSCULAR; INTRAVENOUS
Status: DISCONTINUED | OUTPATIENT
Start: 2024-06-19 | End: 2024-06-19 | Stop reason: HOSPADM

## 2024-06-19 RX ORDER — MORPHINE SULFATE 2 MG/ML
4 INJECTION, SOLUTION INTRAMUSCULAR; INTRAVENOUS
Status: DISCONTINUED | OUTPATIENT
Start: 2024-06-19 | End: 2024-06-20 | Stop reason: HOSPADM

## 2024-06-19 RX ORDER — DIPHENHYDRAMINE HYDROCHLORIDE 50 MG/ML
12.5 INJECTION INTRAMUSCULAR; INTRAVENOUS
Status: DISCONTINUED | OUTPATIENT
Start: 2024-06-19 | End: 2024-06-19 | Stop reason: HOSPADM

## 2024-06-19 RX ORDER — SODIUM CHLORIDE 9 MG/ML
INJECTION, SOLUTION INTRAVENOUS PRN
Status: DISCONTINUED | OUTPATIENT
Start: 2024-06-19 | End: 2024-06-20 | Stop reason: HOSPADM

## 2024-06-19 RX ORDER — SODIUM CHLORIDE 0.9 % (FLUSH) 0.9 %
5-40 SYRINGE (ML) INJECTION EVERY 12 HOURS SCHEDULED
Status: DISCONTINUED | OUTPATIENT
Start: 2024-06-19 | End: 2024-06-19 | Stop reason: HOSPADM

## 2024-06-19 RX ORDER — POLYETHYLENE GLYCOL 3350 17 G/17G
17 POWDER, FOR SOLUTION ORAL DAILY
Status: DISCONTINUED | OUTPATIENT
Start: 2024-06-20 | End: 2024-06-20 | Stop reason: HOSPADM

## 2024-06-19 RX ORDER — ONDANSETRON 4 MG/1
4 TABLET, ORALLY DISINTEGRATING ORAL EVERY 8 HOURS PRN
Status: DISCONTINUED | OUTPATIENT
Start: 2024-06-19 | End: 2024-06-20 | Stop reason: HOSPADM

## 2024-06-19 RX ORDER — DEXAMETHASONE SODIUM PHOSPHATE 4 MG/ML
INJECTION, SOLUTION INTRA-ARTICULAR; INTRALESIONAL; INTRAMUSCULAR; INTRAVENOUS; SOFT TISSUE PRN
Status: DISCONTINUED | OUTPATIENT
Start: 2024-06-19 | End: 2024-06-19 | Stop reason: SDUPTHER

## 2024-06-19 RX ORDER — SODIUM CHLORIDE, SODIUM LACTATE, POTASSIUM CHLORIDE, CALCIUM CHLORIDE 600; 310; 30; 20 MG/100ML; MG/100ML; MG/100ML; MG/100ML
INJECTION, SOLUTION INTRAVENOUS CONTINUOUS PRN
Status: DISCONTINUED | OUTPATIENT
Start: 2024-06-19 | End: 2024-06-19 | Stop reason: SDUPTHER

## 2024-06-19 RX ORDER — PROCHLORPERAZINE EDISYLATE 5 MG/ML
5 INJECTION INTRAMUSCULAR; INTRAVENOUS
Status: DISCONTINUED | OUTPATIENT
Start: 2024-06-19 | End: 2024-06-19 | Stop reason: HOSPADM

## 2024-06-19 RX ORDER — ONDANSETRON 2 MG/ML
INJECTION INTRAMUSCULAR; INTRAVENOUS PRN
Status: DISCONTINUED | OUTPATIENT
Start: 2024-06-19 | End: 2024-06-19 | Stop reason: SDUPTHER

## 2024-06-19 RX ORDER — LIDOCAINE HYDROCHLORIDE 20 MG/ML
INJECTION, SOLUTION INTRAVENOUS PRN
Status: DISCONTINUED | OUTPATIENT
Start: 2024-06-19 | End: 2024-06-19 | Stop reason: SDUPTHER

## 2024-06-19 RX ORDER — IBUPROFEN 600 MG/1
600 TABLET ORAL EVERY 6 HOURS PRN
Qty: 30 TABLET | Refills: 1 | Status: SHIPPED | OUTPATIENT
Start: 2024-06-19

## 2024-06-19 RX ORDER — SODIUM CHLORIDE 0.9 % (FLUSH) 0.9 %
5-40 SYRINGE (ML) INJECTION PRN
Status: DISCONTINUED | OUTPATIENT
Start: 2024-06-19 | End: 2024-06-19 | Stop reason: HOSPADM

## 2024-06-19 RX ORDER — ACETAMINOPHEN 500 MG
1000 TABLET ORAL ONCE
Status: COMPLETED | OUTPATIENT
Start: 2024-06-19 | End: 2024-06-19

## 2024-06-19 RX ORDER — ROCURONIUM BROMIDE 10 MG/ML
INJECTION, SOLUTION INTRAVENOUS PRN
Status: DISCONTINUED | OUTPATIENT
Start: 2024-06-19 | End: 2024-06-19 | Stop reason: SDUPTHER

## 2024-06-19 RX ORDER — SODIUM CHLORIDE 9 MG/ML
INJECTION, SOLUTION INTRAVENOUS PRN
Status: DISCONTINUED | OUTPATIENT
Start: 2024-06-19 | End: 2024-06-19 | Stop reason: HOSPADM

## 2024-06-19 RX ORDER — ENOXAPARIN SODIUM 100 MG/ML
40 INJECTION SUBCUTANEOUS DAILY
Status: DISCONTINUED | OUTPATIENT
Start: 2024-06-20 | End: 2024-06-20 | Stop reason: HOSPADM

## 2024-06-19 RX ORDER — KETOROLAC TROMETHAMINE 30 MG/ML
INJECTION, SOLUTION INTRAMUSCULAR; INTRAVENOUS PRN
Status: DISCONTINUED | OUTPATIENT
Start: 2024-06-19 | End: 2024-06-19 | Stop reason: SDUPTHER

## 2024-06-19 RX ORDER — OXYCODONE HYDROCHLORIDE 5 MG/1
5 TABLET ORAL EVERY 4 HOURS PRN
Status: DISCONTINUED | OUTPATIENT
Start: 2024-06-19 | End: 2024-06-20 | Stop reason: HOSPADM

## 2024-06-19 RX ORDER — HYDROMORPHONE HYDROCHLORIDE 1 MG/ML
0.5 INJECTION, SOLUTION INTRAMUSCULAR; INTRAVENOUS; SUBCUTANEOUS EVERY 5 MIN PRN
Status: DISCONTINUED | OUTPATIENT
Start: 2024-06-19 | End: 2024-06-19 | Stop reason: HOSPADM

## 2024-06-19 RX ORDER — OXYCODONE HYDROCHLORIDE 5 MG/1
5 TABLET ORAL EVERY 12 HOURS
Qty: 10 TABLET | Refills: 0 | Status: SHIPPED | OUTPATIENT
Start: 2024-06-19 | End: 2024-06-24

## 2024-06-19 RX ORDER — SODIUM CHLORIDE, SODIUM LACTATE, POTASSIUM CHLORIDE, CALCIUM CHLORIDE 600; 310; 30; 20 MG/100ML; MG/100ML; MG/100ML; MG/100ML
INJECTION, SOLUTION INTRAVENOUS CONTINUOUS
Status: DISCONTINUED | OUTPATIENT
Start: 2024-06-19 | End: 2024-06-19 | Stop reason: HOSPADM

## 2024-06-19 RX ORDER — HYDROMORPHONE HYDROCHLORIDE 2 MG/ML
INJECTION, SOLUTION INTRAMUSCULAR; INTRAVENOUS; SUBCUTANEOUS PRN
Status: DISCONTINUED | OUTPATIENT
Start: 2024-06-19 | End: 2024-06-19 | Stop reason: SDUPTHER

## 2024-06-19 RX ORDER — NALOXONE HYDROCHLORIDE 0.4 MG/ML
INJECTION, SOLUTION INTRAMUSCULAR; INTRAVENOUS; SUBCUTANEOUS PRN
Status: DISCONTINUED | OUTPATIENT
Start: 2024-06-19 | End: 2024-06-19 | Stop reason: HOSPADM

## 2024-06-19 RX ORDER — SODIUM CHLORIDE 9 MG/ML
INJECTION, SOLUTION INTRAVENOUS CONTINUOUS
Status: ACTIVE | OUTPATIENT
Start: 2024-06-19 | End: 2024-06-20

## 2024-06-19 RX ORDER — APREPITANT 40 MG/1
40 CAPSULE ORAL ONCE
Status: COMPLETED | OUTPATIENT
Start: 2024-06-19 | End: 2024-06-19

## 2024-06-19 RX ORDER — MEPERIDINE HYDROCHLORIDE 25 MG/ML
12.5 INJECTION INTRAMUSCULAR; INTRAVENOUS; SUBCUTANEOUS AS NEEDED
Status: DISCONTINUED | OUTPATIENT
Start: 2024-06-19 | End: 2024-06-19 | Stop reason: HOSPADM

## 2024-06-19 RX ORDER — HYDRALAZINE HYDROCHLORIDE 20 MG/ML
10 INJECTION INTRAMUSCULAR; INTRAVENOUS
Status: DISCONTINUED | OUTPATIENT
Start: 2024-06-19 | End: 2024-06-19 | Stop reason: HOSPADM

## 2024-06-19 RX ORDER — OXYCODONE HYDROCHLORIDE 5 MG/1
10 TABLET ORAL EVERY 4 HOURS PRN
Status: DISCONTINUED | OUTPATIENT
Start: 2024-06-19 | End: 2024-06-20 | Stop reason: HOSPADM

## 2024-06-19 RX ORDER — ONDANSETRON 4 MG/1
4 TABLET, FILM COATED ORAL EVERY 8 HOURS PRN
Qty: 20 TABLET | Refills: 0 | Status: SHIPPED | OUTPATIENT
Start: 2024-06-19

## 2024-06-19 RX ORDER — METHOCARBAMOL 100 MG/ML
INJECTION, SOLUTION INTRAMUSCULAR; INTRAVENOUS PRN
Status: DISCONTINUED | OUTPATIENT
Start: 2024-06-19 | End: 2024-06-19 | Stop reason: SDUPTHER

## 2024-06-19 RX ORDER — FENTANYL CITRATE 50 UG/ML
INJECTION, SOLUTION INTRAMUSCULAR; INTRAVENOUS PRN
Status: DISCONTINUED | OUTPATIENT
Start: 2024-06-19 | End: 2024-06-19 | Stop reason: SDUPTHER

## 2024-06-19 RX ORDER — SODIUM CHLORIDE 0.9 % (FLUSH) 0.9 %
5-40 SYRINGE (ML) INJECTION EVERY 12 HOURS SCHEDULED
Status: DISCONTINUED | OUTPATIENT
Start: 2024-06-19 | End: 2024-06-20 | Stop reason: HOSPADM

## 2024-06-19 RX ORDER — ONDANSETRON 2 MG/ML
4 INJECTION INTRAMUSCULAR; INTRAVENOUS EVERY 6 HOURS PRN
Status: DISCONTINUED | OUTPATIENT
Start: 2024-06-19 | End: 2024-06-20 | Stop reason: HOSPADM

## 2024-06-19 RX ORDER — FAMOTIDINE 20 MG/1
20 TABLET, FILM COATED ORAL 2 TIMES DAILY
Status: DISCONTINUED | OUTPATIENT
Start: 2024-06-19 | End: 2024-06-20 | Stop reason: HOSPADM

## 2024-06-19 RX ORDER — KETOROLAC TROMETHAMINE 30 MG/ML
30 INJECTION, SOLUTION INTRAMUSCULAR; INTRAVENOUS EVERY 6 HOURS
Status: DISCONTINUED | OUTPATIENT
Start: 2024-06-19 | End: 2024-06-20 | Stop reason: HOSPADM

## 2024-06-19 RX ORDER — SODIUM CHLORIDE 0.9 % (FLUSH) 0.9 %
5-40 SYRINGE (ML) INJECTION PRN
Status: DISCONTINUED | OUTPATIENT
Start: 2024-06-19 | End: 2024-06-20 | Stop reason: HOSPADM

## 2024-06-19 RX ORDER — ACETAMINOPHEN 500 MG
1000 TABLET ORAL EVERY 8 HOURS SCHEDULED
Status: DISCONTINUED | OUTPATIENT
Start: 2024-06-19 | End: 2024-06-20 | Stop reason: HOSPADM

## 2024-06-19 RX ADMIN — ACETAMINOPHEN 1000 MG: 500 TABLET ORAL at 21:36

## 2024-06-19 RX ADMIN — WATER 2000 MG: 1 INJECTION INTRAMUSCULAR; INTRAVENOUS; SUBCUTANEOUS at 23:07

## 2024-06-19 RX ADMIN — FENTANYL CITRATE 100 MCG: 50 INJECTION, SOLUTION INTRAMUSCULAR; INTRAVENOUS at 15:21

## 2024-06-19 RX ADMIN — SODIUM CHLORIDE, SODIUM LACTATE, POTASSIUM CHLORIDE, AND CALCIUM CHLORIDE: .6; .31; .03; .02 INJECTION, SOLUTION INTRAVENOUS at 15:46

## 2024-06-19 RX ADMIN — ACETAMINOPHEN 1000 MG: 500 TABLET ORAL at 13:47

## 2024-06-19 RX ADMIN — PROPOFOL 200 MG: 10 INJECTION, EMULSION INTRAVENOUS at 15:23

## 2024-06-19 RX ADMIN — SODIUM CHLORIDE, PRESERVATIVE FREE 10 ML: 5 INJECTION INTRAVENOUS at 21:41

## 2024-06-19 RX ADMIN — KETOROLAC TROMETHAMINE 30 MG: 30 INJECTION, SOLUTION INTRAMUSCULAR; INTRAVENOUS at 21:35

## 2024-06-19 RX ADMIN — MIDAZOLAM HYDROCHLORIDE 2 MG: 2 INJECTION, SOLUTION INTRAMUSCULAR; INTRAVENOUS at 15:17

## 2024-06-19 RX ADMIN — SODIUM CHLORIDE: 9 INJECTION, SOLUTION INTRAVENOUS at 21:40

## 2024-06-19 RX ADMIN — ROCURONIUM BROMIDE 50 MG: 10 INJECTION, SOLUTION INTRAVENOUS at 15:24

## 2024-06-19 RX ADMIN — KETOROLAC TROMETHAMINE 30 MG: 30 INJECTION, SOLUTION INTRAMUSCULAR; INTRAVENOUS at 16:36

## 2024-06-19 RX ADMIN — LIDOCAINE HYDROCHLORIDE 100 MG: 20 INJECTION, SOLUTION INTRAVENOUS at 15:21

## 2024-06-19 RX ADMIN — HYDROMORPHONE HYDROCHLORIDE 0.6 MG: 2 INJECTION, SOLUTION INTRAMUSCULAR; INTRAVENOUS; SUBCUTANEOUS at 15:43

## 2024-06-19 RX ADMIN — DEXAMETHASONE SODIUM PHOSPHATE 8 MG: 4 INJECTION INTRA-ARTICULAR; INTRALESIONAL; INTRAMUSCULAR; INTRAVENOUS; SOFT TISSUE at 15:29

## 2024-06-19 RX ADMIN — WATER 2000 MG: 1 INJECTION INTRAMUSCULAR; INTRAVENOUS; SUBCUTANEOUS at 15:22

## 2024-06-19 RX ADMIN — METHOCARBAMOL 500 MG: 100 INJECTION, SOLUTION INTRAMUSCULAR; INTRAVENOUS at 16:36

## 2024-06-19 RX ADMIN — SODIUM CHLORIDE, SODIUM LACTATE, POTASSIUM CHLORIDE, AND CALCIUM CHLORIDE: .6; .31; .03; .02 INJECTION, SOLUTION INTRAVENOUS at 15:17

## 2024-06-19 RX ADMIN — ONDANSETRON 4 MG: 2 INJECTION INTRAMUSCULAR; INTRAVENOUS at 15:29

## 2024-06-19 RX ADMIN — APREPITANT 40 MG: 40 CAPSULE ORAL at 13:47

## 2024-06-19 RX ADMIN — FAMOTIDINE 20 MG: 20 TABLET, FILM COATED ORAL at 21:36

## 2024-06-19 RX ADMIN — HYDROMORPHONE HYDROCHLORIDE 0.25 MG: 1 INJECTION, SOLUTION INTRAMUSCULAR; INTRAVENOUS; SUBCUTANEOUS at 18:09

## 2024-06-19 RX ADMIN — SODIUM CHLORIDE, SODIUM LACTATE, POTASSIUM CHLORIDE, CALCIUM CHLORIDE: 600; 310; 30; 20 INJECTION, SOLUTION INTRAVENOUS at 15:17

## 2024-06-19 RX ADMIN — SODIUM CHLORIDE, POTASSIUM CHLORIDE, SODIUM LACTATE AND CALCIUM CHLORIDE: 600; 310; 30; 20 INJECTION, SOLUTION INTRAVENOUS at 13:16

## 2024-06-19 ASSESSMENT — PAIN SCALES - GENERAL
PAINLEVEL_OUTOF10: 2
PAINLEVEL_OUTOF10: 0
PAINLEVEL_OUTOF10: 4
PAINLEVEL_OUTOF10: 5

## 2024-06-19 ASSESSMENT — PAIN - FUNCTIONAL ASSESSMENT
PAIN_FUNCTIONAL_ASSESSMENT: PREVENTS OR INTERFERES SOME ACTIVE ACTIVITIES AND ADLS
PAIN_FUNCTIONAL_ASSESSMENT: 0-10
PAIN_FUNCTIONAL_ASSESSMENT: PREVENTS OR INTERFERES SOME ACTIVE ACTIVITIES AND ADLS

## 2024-06-19 ASSESSMENT — PAIN DESCRIPTION - LOCATION
LOCATION: ABDOMEN
LOCATION: ABDOMEN

## 2024-06-19 ASSESSMENT — PAIN DESCRIPTION - FREQUENCY
FREQUENCY: CONTINUOUS
FREQUENCY: CONTINUOUS

## 2024-06-19 ASSESSMENT — PAIN DESCRIPTION - ONSET
ONSET: GRADUAL
ONSET: GRADUAL

## 2024-06-19 ASSESSMENT — PAIN DESCRIPTION - PAIN TYPE
TYPE: SURGICAL PAIN
TYPE: SURGICAL PAIN

## 2024-06-19 ASSESSMENT — PAIN DESCRIPTION - DESCRIPTORS
DESCRIPTORS: CRAMPING
DESCRIPTORS: CRAMPING

## 2024-06-19 ASSESSMENT — PAIN DESCRIPTION - ORIENTATION
ORIENTATION: RIGHT;LEFT;ANTERIOR;MID;LOWER
ORIENTATION: RIGHT;LEFT;ANTERIOR;MID;LOWER

## 2024-06-19 NOTE — H&P
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 PAYAM Sloan for newly diagnosed AIS w/ HSIL on LEEP.   She had an annual exam in December with a Pap smear demonstrating high-grade dysplasia.  She had a follow-up colposcopy Which then led to her LEEP on 2024. Findings demonstrated adenocarcinoma in situ with high-grade squamous intraepithelial lesion extending to the margins. She was referred to our office for further recommendations And management.  She presents to this appointment with her  and her mother. Overall doing well and recovering from her recently procedure    Denies any prior history of cervical dysplasia  No prior surgical history  Medical history unremarkable     Medications & Allergies  Medications:  Dupixent (Dupilumab Subcutaneous) subcutaneously every 2 weeks      Allergies:  No known medication allergies     Previous Therapies      Current Therapy          Interval History  Patient presents for surgery  No change in health since her last visit     Review of Systems    Constitutional:  No weight loss, No fever, No chills, No night sweats.  Energy level good.  Eyes:  No impairment or change in vision  ENT / Mouth:  No pain, abnormal

## 2024-06-19 NOTE — ANESTHESIA POSTPROCEDURE EVALUATION
Department of Anesthesiology  Postprocedure Note    Patient: Melissa Melo  MRN: [id number]  YOB: 1990  Date of evaluation: 6/19/2024    Procedure Summary       Date: 06/19/24 Room / Location: [address]    Anesthesia Start: 1517 Anesthesia Stop: 1706    Procedure: ROBOTIC ASSISTED LAPAROSCOPIC TOTAL HYSTERECTOMY, BILATERAL SALPINGECTOMY, RIGHT OVARIAN CYSTECTOMY Diagnosis:       Malignant neoplasm of cervix, unspecified site (HCC)      (Malignant neoplasm of cervix, unspecified site (HCC) [C53.9])    Surgeons: Skinny Govea DO Responsible Provider: Terrence Rodriguez DO    Anesthesia Type: general ASA Status: 2            Anesthesia Type: No value filed.    Maximo Phase I: Maximo Score: 8    Maximo Phase II:      Anesthesia Post Evaluation    Patient location during evaluation: PACU  Patient participation: complete - patient participated  Level of consciousness: awake and alert  Pain score: 0  Airway patency: patent  Nausea & Vomiting: no nausea and no vomiting  Cardiovascular status: hemodynamically stable  Respiratory status: acceptable  Hydration status: stable  Pain management: adequate and satisfactory to patient    No notable events documented.

## 2024-06-19 NOTE — DISCHARGE INSTRUCTIONS
Laparoscopic Hysterectomy: What to Expect at Home  Your Recovery     A laparoscopic hysterectomy is surgery to take out the uterus. Your doctor put a lighted tube and surgical tools through small cuts in your belly to remove the uterus. The cervix is usually removed too. In some cases, the ovaries and fallopian tubes also are taken out at the same time.  You can expect to feel better and stronger each day. But you might need pain medicine for a week or two. After a laparoscopy, you may have shoulder pain. This is caused by the air your doctor put in your belly to help see your organs better. The pain may last for a day or two. You may get tired easily or have less energy than usual. The tiredness may last for several weeks after surgery. And you also may have light vaginal bleeding for a few weeks.  It's important to avoid lifting while you are recovering so that you can heal. It may take about 4 to 6 weeks to fully recover. The recovery time may be shorter for some people.  This care sheet gives you a general idea about how long it will take for you to recover. But each person recovers at a different pace. Follow the steps below to get better as quickly as possible.  How can you care for yourself at home?  Activity    Rest when you feel tired. Getting enough sleep will help you recover.     Try to walk each day. Start by walking a little more than you did the day before. Bit by bit, increase the amount you walk. Walking boosts blood flow and helps prevent pneumonia and constipation.     Avoid lifting anything that would make you strain. This may include heavy grocery bags and milk containers, a heavy briefcase or backpack, bags of cat litter or dog food, a vacuum , or a child.     Avoid strenuous activities, such as biking, jogging, weight lifting, or aerobic exercise, until your doctor says it is okay.     Ask your doctor when you can drive again.     You may shower 24 to 48 hours after surgery, if  your doctor if you are having problems. It's also a good idea to know your test results and keep a list of the medicines you take.  When should you call for help?   Call 911 anytime you think you may need emergency care. For example, call if:    You passed out (lost consciousness).     You have chest pain, are short of breath, or cough up blood.   Call your doctor now or seek immediate medical care if:    You have pain that does not get better after you take pain medicine.     You cannot pass stools or gas.     You have vaginal discharge that has increased in amount or smells bad.     You are sick to your stomach or cannot drink fluids.     You have loose stitches, or your incision comes open.     Bright red blood has soaked through the bandage over your incision.     You have signs of infection, such as:  Increased pain, swelling, warmth, or redness.  Red streaks leading from the incision.  Pus draining from the incision.  A fever.     You have severe vaginal bleeding. This means that you are soaking through your usual pads every hour for 2 or more hours.     You have signs of a blood clot in your leg (called a deep vein thrombosis), such as:  Pain in your calf, back of the knee, thigh, or groin.  Redness and swelling in your leg or groin.   Watch closely for changes in your health, and be sure to contact your doctor if you have any problems.  Where can you learn more?  Go to https://www.[employer].net/patientEd and enter Q131 to learn more about \"Laparoscopic Hysterectomy: What to Expect at Home.\"  Current as of: November 27, 2023  Content Version: 14.1  © 2006-2024 [employer].   Care instructions adapted under license by [employer]. If you have questions about a medical condition or this instruction, always ask your healthcare professional. [employer] disclaims any warranty or liability for your use of this information.

## 2024-06-19 NOTE — ANESTHESIA PRE PROCEDURE
Department of Anesthesiology  Preprocedure Note       Name:  Melissa Melo   Age:  33 y.o.  :  1990                                          MRN:  [id number]         Date:  2024      Surgeon: Surgeon(s):  Skinny Govea DO    Procedure: Procedure(s):  ROBOTIC ASSISTED LAPAROSCOPIC TOTAL HYSTERECTOMY, BILATERAL SALPINGECTOMY, POSSIBLE BILATERAL SALPINGO-OOPHORECTOMY, POSSIBLE PELVIC AND PARA-AORTIC LYMPHADENECTOMY, POSSIBLE OMENTECTOMY, POSSIBLE LAPARATOMY, POSSIBLE STAGING    Medications prior to admission:   Prior to Admission medications    Medication Sig Start Date End Date Taking? Authorizing Provider   dupilumab (DUPIXENT) 300 MG/2ML SOSY injection Inject 2 mLs into the skin every 14 days 3/13/24   Pushpa Cleveland MD   Ruxolitinib Phosphate (OPZELURA) 1.5 % CREA Apply topically daily as needed    Provider, MD Janine       Current medications:    Current Facility-Administered Medications   Medication Dose Route Frequency Provider Last Rate Last Admin   • ceFAZolin (ANCEF) 2,000 mg in sterile water 20 mL IV syringe  2,000 mg IntraVENous Once Skinny Govea DO       • lactated ringers IV soln infusion   IntraVENous Continuous Bassam Fernández  mL/hr at 24 1316 New Bag at 24 1316   • aprepitant (EMEND) capsule 40 mg  40 mg Oral Once Curtis Moulton DO       • acetaminophen (TYLENOL) tablet 1,000 mg  1,000 mg Oral Once Curtis Moulton DO       • scopolamine (TRANSDERM-SCOP) transdermal patch 1 patch  1 patch TransDERmal Q72H Curtis Moulton DO           Allergies:  No Known Allergies    Problem List:    Patient Active Problem List   Diagnosis Code   • Postpartum hemorrhoids O87.2   • Benign neoplasm of skin D23.9       Past Medical History:        Diagnosis Date   • Cervical cancer (HCC)    • Eczema    • External hemorrhoids    • Pregnancy induced hypertension     with 1st pregnancy, no problems with this pregnancy   • Pyelonephritis        Past

## 2024-06-19 NOTE — PROGRESS NOTES
Patient brought to PACU #8 from the OR. Patient placed on monitors. Report received from RN and CRNA. No signs of discomfort at this time.

## 2024-06-19 NOTE — OP NOTE
GYNECOLOGIC ONCOLOGY NOTE    Operative Note      Patient: Melissa Melo  YOB: 1990  MRN: [id number]    Date of Procedure: 6/19/2024    Pre-Op Diagnosis Codes:     * Malignant neoplasm of cervix, unspecified site (HCC) [C53.9]    Post-Op Diagnosis: Same       Procedure(s):  ROBOTIC ASSISTED LAPAROSCOPIC TOTAL HYSTERECTOMY, BILATERAL SALPINGECTOMY, RIGHT OVARIAN CYSTECTOMY    Surgeon(s):  Skinny Govea DO    Assistant:   Surgical Assistant: Jarred Pierce Charles    Anesthesia: General    Estimated Blood Loss (mL): less than 50     Complications: None    Specimens:   ID Type Source Tests Collected by Time Destination   1 : PELVIC WASHINGS Body Fluid Fluid CYTOLOGY, NON-GYN Skinny Govea DO 6/19/2024 1549    A : UTERUS, CERVIX, AND BILATERAL TUBES Tissue Tissue SURGICAL PATHOLOGY Skinny Govea DO 6/19/2024 1617    B : RIGHT OVARIAN CYST Tissue Tissue SURGICAL PATHOLOGY Skinny Govea DO 6/19/2024 1629        Implants:  * No implants in log *      Drains:   Urinary Catheter 06/19/24 Matt (Active)       Findings:  Infection Present At Time Of Surgery (PATOS) (choose all levels that have infection present):  No infection present  Other Findings:   - normal appearing vulva and vagina.   Cervix with some ectropion. No obvious masses  Uterus sounded to 8cm  Right ovarian cyst noted and cystectomy performed  Normal appear left tube and ovary  No carcinomatosis. No ascites. Normal upper abdominal survey      Detailed Description of Procedure:   After assuring informed consent the patient was taken back to the operating suite and induction of general anesthesia was done.  The patient was placed in the modified dorsal lithotomy position in [facility] then prepped and draped in the normal sterile fashion.  The weighed speculum was placed in the patient’s vagina and the cervix was grasped at the 12 O’clock position with an Allis clamp. The uterus was sounded to 8 cm and the cervix was dilated with

## 2024-06-20 VITALS
OXYGEN SATURATION: 99 % | HEART RATE: 57 BPM | BODY MASS INDEX: 25.88 KG/M2 | DIASTOLIC BLOOD PRESSURE: 83 MMHG | SYSTOLIC BLOOD PRESSURE: 121 MMHG | RESPIRATION RATE: 16 BRPM | HEIGHT: 62 IN | WEIGHT: 140.6 LBS | TEMPERATURE: 97.3 F

## 2024-06-20 LAB
ALBUMIN SERPL-MCNC: 3.7 G/DL (ref 3.4–5)
ANION GAP SERPL CALCULATED.3IONS-SCNC: 9 MMOL/L (ref 3–16)
ANION GAP SERPL CALCULATED.3IONS-SCNC: 9 MMOL/L (ref 3–16)
BUN SERPL-MCNC: 8 MG/DL (ref 7–20)
BUN SERPL-MCNC: 8 MG/DL (ref 7–20)
CALCIUM SERPL-MCNC: 8.8 MG/DL (ref 8.3–10.6)
CALCIUM SERPL-MCNC: 8.8 MG/DL (ref 8.3–10.6)
CHLORIDE SERPL-SCNC: 105 MMOL/L (ref 99–110)
CHLORIDE SERPL-SCNC: 105 MMOL/L (ref 99–110)
CO2 SERPL-SCNC: 24 MMOL/L (ref 21–32)
CO2 SERPL-SCNC: 25 MMOL/L (ref 21–32)
CREAT SERPL-MCNC: 0.6 MG/DL (ref 0.6–1.1)
CREAT SERPL-MCNC: 0.6 MG/DL (ref 0.6–1.1)
GFR SERPLBLD CREATININE-BSD FMLA CKD-EPI: >90 ML/MIN/{1.73_M2}
GFR SERPLBLD CREATININE-BSD FMLA CKD-EPI: >90 ML/MIN/{1.73_M2}
GLUCOSE SERPL-MCNC: 112 MG/DL (ref 70–99)
GLUCOSE SERPL-MCNC: 112 MG/DL (ref 70–99)
HCT VFR BLD AUTO: 38.3 % (ref 36–48)
HGB BLD-MCNC: 12.8 G/DL (ref 12–16)
PHOSPHATE SERPL-MCNC: 2.8 MG/DL (ref 2.5–4.9)
POTASSIUM SERPL-SCNC: 4.7 MMOL/L (ref 3.5–5.1)
POTASSIUM SERPL-SCNC: 4.9 MMOL/L (ref 3.5–5.1)
SODIUM SERPL-SCNC: 138 MMOL/L (ref 136–145)
SODIUM SERPL-SCNC: 139 MMOL/L (ref 136–145)

## 2024-06-20 PROCEDURE — 6360000002 HC RX W HCPCS: Performed by: OBSTETRICS & GYNECOLOGY

## 2024-06-20 PROCEDURE — 96372 THER/PROPH/DIAG INJ SC/IM: CPT

## 2024-06-20 PROCEDURE — 80069 RENAL FUNCTION PANEL: CPT

## 2024-06-20 PROCEDURE — 85014 HEMATOCRIT: CPT

## 2024-06-20 PROCEDURE — 2580000003 HC RX 258: Performed by: OBSTETRICS & GYNECOLOGY

## 2024-06-20 PROCEDURE — 36415 COLL VENOUS BLD VENIPUNCTURE: CPT

## 2024-06-20 PROCEDURE — 96374 THER/PROPH/DIAG INJ IV PUSH: CPT

## 2024-06-20 PROCEDURE — 96376 TX/PRO/DX INJ SAME DRUG ADON: CPT

## 2024-06-20 PROCEDURE — 6370000000 HC RX 637 (ALT 250 FOR IP): Performed by: OBSTETRICS & GYNECOLOGY

## 2024-06-20 PROCEDURE — G0378 HOSPITAL OBSERVATION PER HR: HCPCS

## 2024-06-20 PROCEDURE — 85018 HEMOGLOBIN: CPT

## 2024-06-20 RX ADMIN — POLYETHYLENE GLYCOL 3350 17 G: 17 POWDER, FOR SOLUTION ORAL at 08:19

## 2024-06-20 RX ADMIN — KETOROLAC TROMETHAMINE 30 MG: 30 INJECTION, SOLUTION INTRAMUSCULAR; INTRAVENOUS at 03:17

## 2024-06-20 RX ADMIN — ACETAMINOPHEN 1000 MG: 500 TABLET ORAL at 05:45

## 2024-06-20 RX ADMIN — WATER 2000 MG: 1 INJECTION INTRAMUSCULAR; INTRAVENOUS; SUBCUTANEOUS at 06:42

## 2024-06-20 RX ADMIN — SODIUM CHLORIDE: 9 INJECTION, SOLUTION INTRAVENOUS at 07:32

## 2024-06-20 RX ADMIN — FAMOTIDINE 20 MG: 20 TABLET, FILM COATED ORAL at 08:19

## 2024-06-20 RX ADMIN — KETOROLAC TROMETHAMINE 30 MG: 30 INJECTION, SOLUTION INTRAMUSCULAR; INTRAVENOUS at 11:00

## 2024-06-20 RX ADMIN — ENOXAPARIN SODIUM 40 MG: 100 INJECTION SUBCUTANEOUS at 08:18

## 2024-06-20 ASSESSMENT — PAIN - FUNCTIONAL ASSESSMENT
PAIN_FUNCTIONAL_ASSESSMENT: PREVENTS OR INTERFERES SOME ACTIVE ACTIVITIES AND ADLS
PAIN_FUNCTIONAL_ASSESSMENT: ACTIVITIES ARE NOT PREVENTED
PAIN_FUNCTIONAL_ASSESSMENT: PREVENTS OR INTERFERES SOME ACTIVE ACTIVITIES AND ADLS

## 2024-06-20 ASSESSMENT — PAIN DESCRIPTION - LOCATION
LOCATION: ABDOMEN
LOCATION: ABDOMEN;FLANK
LOCATION: ABDOMEN

## 2024-06-20 ASSESSMENT — PAIN DESCRIPTION - ONSET
ONSET: GRADUAL
ONSET: ON-GOING
ONSET: GRADUAL

## 2024-06-20 ASSESSMENT — PAIN SCALES - GENERAL
PAINLEVEL_OUTOF10: 3
PAINLEVEL_OUTOF10: 0
PAINLEVEL_OUTOF10: 4
PAINLEVEL_OUTOF10: 6
PAINLEVEL_OUTOF10: 6
PAINLEVEL_OUTOF10: 0
PAINLEVEL_OUTOF10: 2

## 2024-06-20 ASSESSMENT — PAIN DESCRIPTION - DESCRIPTORS
DESCRIPTORS: CRAMPING
DESCRIPTORS: CRAMPING;SHARP
DESCRIPTORS: CRAMPING

## 2024-06-20 ASSESSMENT — PAIN DESCRIPTION - FREQUENCY
FREQUENCY: CONTINUOUS

## 2024-06-20 ASSESSMENT — PAIN DESCRIPTION - DIRECTION: RADIATING_TOWARDS: NO

## 2024-06-20 ASSESSMENT — PAIN DESCRIPTION - ORIENTATION
ORIENTATION: RIGHT
ORIENTATION: RIGHT;LEFT;ANTERIOR;MID;LOWER
ORIENTATION: RIGHT;LEFT;ANTERIOR;MID;LOWER

## 2024-06-20 ASSESSMENT — PAIN DESCRIPTION - PAIN TYPE
TYPE: SURGICAL PAIN

## 2024-06-20 NOTE — CARE COORDINATION
Case Management Assessment  Initial Evaluation    Date/Time of Evaluation: 6/20/2024 11:39 AM  Assessment Completed by: Cami Min RN    If patient is discharged prior to next notation, then this note serves as note for discharge by case management.    Patient Name: Melissa Melo                   YOB: 1990  Diagnosis: Malignant neoplasm of cervix, unspecified site (HCC) [C53.9]  S/P laparoscopic hysterectomy [Z90.710]                   Date / Time: 6/19/2024 12:35 PM    Patient Admission Status: Observation   Readmission Risk (Low < 19, Mod (19-27), High > 27): No data recorded  Current PCP: Danyell Gustafson MD  PCP verified by CM? No    Chart Reviewed: Yes      History Provided by: Patient  Patient Orientation: Alert and Oriented    Patient Cognition: Alert    Hospitalization in the last 30 days (Readmission):  No    If yes, Readmission Assessment in  Navigator will be completed.    Advance Directives:      Code Status: Full Code   Patient's Primary Decision Maker is: Legal Next of Kin      Discharge Planning:    Patient lives with: Spouse/Significant Other Type of Home: House  Primary Care Giver: Self  Patient Support Systems include: Spouse/Significant Other   Current Financial resources: None  Current community resources: None  Current services prior to admission: None            Current DME:              Type of Home Care services:  None    ADLS  Prior functional level: Independent in ADLs/IADLs  Current functional level: Independent in ADLs/IADLs    PT AM-PAC:   /24  OT AM-PAC:   /24    Family can provide assistance at DC: Yes  Would you like Case Management to discuss the discharge plan with any other family members/significant others, and if so, who? No  Plans to Return to Present Housing: Yes  Other Identified Issues/Barriers to RETURNING to current housing: n/a  Potential Assistance needed at discharge: N/A            Potential DME:    Patient expects to discharge to: House  Plan

## 2024-06-20 NOTE — PLAN OF CARE
Problem: Discharge Planning  Goal: Discharge to home or other facility with appropriate resources  6/20/2024 1149 by Shelia Robledo RN  Outcome: Completed  6/19/2024 2238 by Neva Rivas RN  Outcome: Progressing     Problem: Pain  Goal: Verbalizes/displays adequate comfort level or baseline comfort level  6/20/2024 1149 by Shelia Robledo RN  Outcome: Completed  6/19/2024 2238 by Neva Rivas RN  Outcome: Progressing     Problem: ABCDS Injury Assessment  Goal: Absence of physical injury  6/20/2024 1149 by Shelia Robledo RN  Outcome: Completed  6/19/2024 2238 by Neva Rivas RN  Outcome: Progressing     Problem: Safety - Adult  Goal: Free from fall injury  Outcome: Completed

## 2024-06-20 NOTE — PROGRESS NOTES
Patient voided 400ml clear yellow urine in measuring hat in toilet s/p catheter removal. Awaiting lab work to be complete prior to DC. Electronically signed by Shelia Robledo RN on 6/20/2024 at 9:31 AM

## 2024-06-20 NOTE — PROGRESS NOTES
Progress Note     Name: Melissa Melo Room: 5311/5311-01   YOB: 1990 MRN: [id number]   Sex: female CSN: [id number]    LOS: 0   PCP: Danyell Gustafson MD   Attending: Skinny Govea DO Admitting: Skinny Govea DO        Subjective:   Doing well  OOB. Voiding  Giana diet  Pain well controlled    Physical Exam:   /74   Pulse 56   Temp 97.3 °F (36.3 °C) (Oral)   Resp 16   Ht 1.575 m (5' 2.01\")   Wt 63.8 kg (140 lb 9.6 oz)   SpO2 97%   BMI 25.71 kg/m²     Gen: AAO  Resp: CTAB  CV: RRR  Abd: +BS, soft, nondistended, appropriately tender  Inc: c/d/i, no erythema  Ext: nontender, no evidence of DVT     No Known Allergies    Medications:   Reviewed    Diagnostic:   Reviewed    Labs:   reviewed  No results for input(s): \"WBC\", \"HGB\", \"HCT\", \"PLT\" in the last 72 hours.  No results for input(s): \"NA\", \"K\", \"CL\", \"CO2\", \"BUN\", \"CREATININE\", \"CALCIUM\", \"PHOS\" in the last 72 hours.    Invalid input(s): \"MAGNES\"  No results for input(s): \"AST\", \"ALT\", \"BILIDIR\", \"BILITOT\", \"ALKPHOS\" in the last 72 hours.  No results for input(s): \"INR\" in the last 72 hours.  No results for input(s): \"\" in the last 72 hours.    Assessment:     Patient Active Problem List   Diagnosis Code    Postpartum hemorrhoids O87.2    Benign neoplasm of skin D23.9    S/P laparoscopic hysterectomy Z90.710       Impression/Plan:   POD1  S/p ESTHER HARVEY for AIS    PLAN:  DC home today once labs obtained and reviewed    DC instructions, call parameters, restrictions and precautions all reviewed. All questions answered.     Activity: No heavy lifting (greater than 10-15 lbs), Driving, or Strenuous exercise for 2 weeks. Nothing in the vagina (no sex, douching or tampon use, etc) for 6 weeks  Diet: regular diet  Wound Care: keep wound clean and dry. May shower or bathe. No hot tubs or swimming pools for 6 weeks.     Follow-up with Dr. Govea in 2 weeks. Call the office to schedule your follow up appointment if not already confirmed.

## 2024-06-20 NOTE — PROGRESS NOTES
Discharge  Patient being discharged home with family support. IV x2 removed. AVS reviewed, all questions answered. Patient to lobby via wheelchair and RN. Electronically signed by Shelia Robledo RN on 6/20/2024 at 12:24 PM

## 2024-06-20 NOTE — PROGRESS NOTES
4 Eyes Admission Assessment     I agree as the admission nurse that 2 RN's have performed a thorough Head to Toe Skin Assessment on the patient. ALL assessment sites listed below have been assessed on admission.       Areas assessed by both nurses:   [x]   Head, Face, and Ears   [x]   Shoulders, Back, and Chest  [x]   Arms, Elbows, and Hands   [x]   Coccyx, Sacrum, and Ischium  [x]   Legs, Feet, and Heels        Does the Patient have Skin Breakdown?  Patient has 4 abdominal surgical lap sites.        Darin Prevention initiated:  No   Wound Care Orders initiated:  No      [facility] nurse consulted for Pressure Injury (Stage 3,4, Unstageable, DTI, NWPT, and Complex wounds) or Darin score 18 or lower:  No      Nurse 1 eSignature: Electronically signed by LUDMILA SAL RN on 6/20/24 at 12:22 AM EDT    **SHARE this note so that the co-signing nurse is able to place an eSignature**    Nurse 2 eSignature: Electronically signed by Linda Ambrosio RN on 6/20/24 at 5:41 AM EDT

## 2024-06-20 NOTE — PROGRESS NOTES
Patient admitted to room 5311 from PACU. Patient is A&O x 4. VSS stable. Patient oriented to the room all safety measures in place. Patient given IS and SCDs at this time. Admission orders released and patient 4 eyes completed. Admission documentation completed. No other needs are noted at this time.    [x] Bed alarm on and cord plugged into wall  [x] Bed in lowest position  [x] Call light and bedside table within reach  [x] Patient educated on all safety measures  []Oxygen connected to wall (if applicable)     Nurse 1 Esignature: Electronically signed by LUDMILA SAL, RN on 6/20/24 at 12:21 AM EDT  Nurse 2 Esignature: Electronically signed by Linda Ambrosio, RN on 6/20/24 at 5:41 AM EDT

## 2024-06-20 NOTE — PROGRESS NOTES
Removed magdaleno at 0603. Catheter intact and patient tolerated well. Emptied 1350 ml's out of magdaleno bag. Patient walked 300 ft in hallway with nurse.

## 2024-06-20 NOTE — PROGRESS NOTES
PACU Transfer Note    Vitals:    06/19/24 2015   BP: 113/69   Pulse: 74   Resp: 17   Temp: 97.9   SpO2: 98%     Patient denies nausea at this time.     In: 895 [P.O.:480; I.V.:415]  Out: 611 [Urine:611]    Pain assessment:  present - adequately treated  Pain Level: 2    Report given to Receiving unit Danielle MARTIN at bedside. Axillary temperature done d/t patient drinking iced soda at the time, 97.9 verified with RN. Patient transported to [facility] by bed with all belongings with  at bedside by Husam zuniga transporter.    6/19/2024 8:47 PM

## 2024-06-21 ENCOUNTER — CARE COORDINATION (OUTPATIENT)
Dept: OTHER | Facility: CLINIC | Age: 34
End: 2024-06-21

## 2024-06-21 NOTE — CARE COORDINATION
Care Transitions Note    Initial Call - Call within 2 business days of discharge: Yes    Patient Current Location:  Home: [address] [place] [id number]    Care Transition Nurse contacted the patient by telephone to perform post hospital discharge assessment, verified name and  as identifiers. Provided introduction to self, and explanation of the Care Transition Nurse role.     Patient: Melissa Melo    Patient : 1990   MRN: [id number]    Reason for Admission: Lap Hysterectomy w/ BSO  Discharge Date: 24  RURS: No data recorded    Last Discharge Facility       Date Complaint Diagnosis Description Type Department Provider    24  S/P laparoscopic hysterectomy ... Admission (Discharged) [facility] Skinny Aguiar, DO     Was this an external facility discharge? No    Additional needs identified to be addressed with provider   No needs identified             Method of communication with provider: none.    Patients top risk factors for readmission: medical condition-recent surgery     Interventions to address risk factors:   Review of patient management of conditions/medications: Reinforced DC instructions. Patient has medications filled and taking as directed and has follow up apt scheduled with reliable transportation.     Care Summary Note: Patient stats she is overall doing well. She has all medications filled and taking as directed.  She feels her ain is well controlled and no new or worsening symptoms at this time. She is able to describe Red Flag symptoms to report and has provider contact information.  She has her follow up appt scheduled. She has her  and mother to assist her if needed.  She denies any immediate ACM needs and is agreeable to follow up contact.,     Care Transition Nurse reviewed discharge instructions, medical action plan, and red flags with patient. The patient was given an opportunity to ask questions; no further questions or concerns at this time.. The

## 2024-07-02 ENCOUNTER — TELEPHONE (OUTPATIENT)
Dept: PHARMACY | Age: 34
End: 2024-07-02

## 2024-07-02 DIAGNOSIS — L20.9 ATOPIC DERMATITIS, UNSPECIFIED TYPE: Primary | ICD-10-CM

## 2024-07-02 NOTE — TELEPHONE ENCOUNTER
Specialty Medication Service    Date: 7/2/2024  Patient's Name: Melissa Melo YOB: 1990            _____________________________________________________________________________________________     Melissa Melo is a 33 y.o.  female enrolled in the Specialty Medication Service program. Refill request received for Dupixent.    Patient does have active CPA on file.   Patient last SMS pharmacist visit was within the last 6 months.  Patient last medical director visit was within the last year.  Patient has seen their specialist within the last year.   Labs are not necessary .   SMS medical director referral is on file: yes  Next SMS visit is anticipated for November 2024.     Chart reviewed. No significant concerns noted, patient is compliant with the program.     Will approve the refill for #4mL + 3 refills, per the original provider order.      Orders Placed This Encounter    Dupilumab 100 MG/0.67ML SOSY     Sig: Inject 300 mg into the skin every 14 days     Dispense:  4 mL     Refill:  3     Fidelia Salazar PharmD  Ambulatory Clinical Pharmacist   Specialty Medication Services   Phone: [phone] option 4  7/2/2024 5:36 PM     For Pharmacy Admin Tracking Only    [place] [facility] [place]  CPA in place:  Yes  Recommendation Provided To: Patient/Caregiver: 1 via Telephone  Intervention Detail: Refill(s) Provided  Intervention Accepted By: Patient/Caregiver: 1   Time Spent (min): 10

## 2024-07-02 NOTE — TELEPHONE ENCOUNTER
Specialty Medication Service    Date: 7/2/2024  Patient's Name: Melissa Melo YOB: 1990            _____________________________________________________________________________________________    Melissa Melo is a 33 y.o. female enrolled in the Specialty Medication Service.     We received a refill request for Dupixent on 07/02/24.     Original Rx was written for 4 fills on 07/02/24.     Thank you,    Lupe Lazaro      Requested Prescriptions     Pending Prescriptions Disp Refills    Dupilumab 100 MG/0.67ML SOSY 4 mL 3     Sig: Inject 300 mg into the skin every 14 days

## 2024-07-05 ENCOUNTER — CARE COORDINATION (OUTPATIENT)
Dept: OTHER | Facility: CLINIC | Age: 34
End: 2024-07-05

## 2024-07-05 NOTE — CARE COORDINATION
Care Transitions Note    Follow Up Call     Patient Current Location:  [place]    Care Transition Nurse contacted the patient by telephone. Verified name and  as identifiers.    Additional needs identified to be addressed with provider   No needs identified         Method of communication with provider: none.    Care Summary Note: Patient states she continues to improve.  She felt she had increased pain last we and thought she may have a UTI.  She was started on antibiotics, but there urine culture was negative.  She feels her pain has improved in the past 2 days.  She has been increasing her activity as tolerated as recommended.  She has her follow up appt scheduled for the end of he month with a RTW date for 24.  She denies any immediate ACM needs and is agreeable to follow up contact.     Plan of care updates since last contact:  Review of patient management of conditions/medications: Patient is able to describe Red flag symptoms to report, is engaged with providers, and has medications filled and taking as directed.       Advance Care Planning:   Does patient have an Advance Directive: deferred at this time, will discuss on future follow up. .    Medication Review:  Full medication reconciliation completed during previous call.    Assessments:  Care Transitions Subsequent and Final Call    Schedule Follow Up Appointment with PCP: Completed  Subsequent and Final Calls  Do you have any ongoing symptoms?: Yes  Onset of Patient-reported symptoms: In the past 7 days  Patient-reported symptoms: Pain  Interventions for patient-reported symptoms: Other  Have your medications changed?: Yes  Patient Reports: antibiotic added/ completed  Do you have any questions related to your medications?: No  Do you currently have any active services?: No  Do you have any needs or concerns that I can assist you with?: No  Identified Barriers: None  Care Transitions Interventions    Specialty Service Referral: Completed    Other

## 2024-07-23 ENCOUNTER — CARE COORDINATION (OUTPATIENT)
Dept: OTHER | Facility: CLINIC | Age: 34
End: 2024-07-23

## 2024-07-23 NOTE — CARE COORDINATION
Care Transitions Note    Follow Up Call     Attempted to reach patient for transitions of care follow up.  Unable to reach patient.      Outreach Attempts:   HIPAA compliant voicemail left for patient.   [employer] message sent.     Care Summary Note: Per chart review, no changes since last outreach.  Uma does have PCP appt scheduled for 7/25/24.    Follow Up Appointment:   Future Appointments         Provider Specialty Dept Phone    7/25/2024 3:30 PM Danyell Gustafson MD Family Medicine [phone]          Plan for follow-up call in 6-10 days based on severity of symptoms and risk factors. Plan for next call: follow-up appointment-WEN appt made/ completed?    Shirley Bartholomew RN

## 2024-07-25 ENCOUNTER — OFFICE VISIT (OUTPATIENT)
Dept: FAMILY MEDICINE CLINIC | Age: 34
End: 2024-07-25
Payer: COMMERCIAL

## 2024-07-25 VITALS
OXYGEN SATURATION: 98 % | SYSTOLIC BLOOD PRESSURE: 112 MMHG | BODY MASS INDEX: 26.5 KG/M2 | HEIGHT: 62 IN | WEIGHT: 144 LBS | HEART RATE: 70 BPM | DIASTOLIC BLOOD PRESSURE: 68 MMHG

## 2024-07-25 DIAGNOSIS — Z00.00 ANNUAL PHYSICAL EXAM: Primary | ICD-10-CM

## 2024-07-25 DIAGNOSIS — Z85.41 HISTORY OF CERVICAL CANCER: ICD-10-CM

## 2024-07-25 DIAGNOSIS — Z90.710 S/P LAPAROSCOPIC HYSTERECTOMY: ICD-10-CM

## 2024-07-25 PROCEDURE — 99395 PREV VISIT EST AGE 18-39: CPT | Performed by: FAMILY MEDICINE

## 2024-07-25 NOTE — PROGRESS NOTES
Chief Complaint:   Melissa Melo is a 34 y.o. female who presents for complete physical examination.    History of Present Illness:    Hx cervical cancer s/p hysterectomy on 6/19/24. Sees Dr. Govea. Had follow up today. Planning annual pap of vaginal cuff. Has gynecologist also.     Sees derm regularly for full skin checks.      Past Medical History:   Diagnosis Date    Cervical cancer (HCC)     Eczema     External hemorrhoids     Pregnancy induced hypertension     Pyelonephritis 2011       Past Surgical History:   Procedure Laterality Date    HYSTERECTOMY (CERVIX STATUS UNKNOWN) N/A 6/19/2024    ROBOTIC ASSISTED LAPAROSCOPIC TOTAL HYSTERECTOMY, BILATERAL SALPINGECTOMY, RIGHT OVARIAN CYSTECTOMY performed by Skinny Govea DO at [facility] OR    LEEP N/A 4/16/2024    LOOP ELECTRICAL EXCISION PROCEDURE WITH CONIZATION OF CERVIX performed by Vadim Sloan MD at [facility] OR    WISDOM TOOTH EXTRACTION         Outpatient Medications Marked as Taking for the 7/25/24 encounter (Office Visit) with Danyell Gustafson MD   Medication Sig Dispense Refill    Dupilumab 100 MG/0.67ML SOSY Inject 300 mg into the skin every 14 days 4 mL 3     No Known Allergies    Social History     Socioeconomic History    Marital status:      Spouse name: None    Number of children: 2    Years of education: None    Highest education level: None   Occupational History    Occupation: speech therapist   Tobacco Use    Smoking status: Never    Smokeless tobacco: Never   Vaping Use    Vaping Use: Never used   Substance and Sexual Activity    Alcohol use: Yes     Alcohol/week: 4.0 standard drinks of alcohol     Types: 4 Standard drinks or equivalent per week     Comment: socailly    Drug use: No    Sexual activity: Yes     Partners: Male     Birth control/protection: OCP     Social Determinants of Health     Financial Resource Strain: Low Risk  (5/30/2024)    Overall Financial Resource Strain (CARDIA)     Difficulty of Paying Living Expenses: Not hard  Gilda Crain

## 2024-07-31 DIAGNOSIS — Z00.00 ANNUAL PHYSICAL EXAM: ICD-10-CM

## 2024-07-31 LAB
ALBUMIN SERPL-MCNC: 4.3 G/DL (ref 3.4–5)
ALBUMIN/GLOB SERPL: 1.7 {RATIO} (ref 1.1–2.2)
ALP SERPL-CCNC: 55 U/L (ref 40–129)
ALT SERPL-CCNC: 20 U/L (ref 10–40)
ANION GAP SERPL CALCULATED.3IONS-SCNC: 9 MMOL/L (ref 3–16)
AST SERPL-CCNC: 20 U/L (ref 15–37)
BASOPHILS # BLD: 0 K/UL (ref 0–0.2)
BASOPHILS NFR BLD: 0.7 %
BILIRUB SERPL-MCNC: 0.4 MG/DL (ref 0–1)
BUN SERPL-MCNC: 16 MG/DL (ref 7–20)
CALCIUM SERPL-MCNC: 9.5 MG/DL (ref 8.3–10.6)
CHLORIDE SERPL-SCNC: 103 MMOL/L (ref 99–110)
CHOLEST SERPL-MCNC: 168 MG/DL (ref 0–199)
CO2 SERPL-SCNC: 27 MMOL/L (ref 21–32)
CREAT SERPL-MCNC: 0.7 MG/DL (ref 0.6–1.1)
DEPRECATED RDW RBC AUTO: 13 % (ref 12.4–15.4)
EOSINOPHIL # BLD: 0.2 K/UL (ref 0–0.6)
EOSINOPHIL NFR BLD: 3.2 %
GFR SERPLBLD CREATININE-BSD FMLA CKD-EPI: >90 ML/MIN/{1.73_M2}
GLUCOSE SERPL-MCNC: 82 MG/DL (ref 70–99)
HCT VFR BLD AUTO: 41.2 % (ref 36–48)
HDLC SERPL-MCNC: 65 MG/DL (ref 40–60)
HGB BLD-MCNC: 13.5 G/DL (ref 12–16)
LDLC SERPL CALC-MCNC: 89 MG/DL
LYMPHOCYTES # BLD: 1.8 K/UL (ref 1–5.1)
LYMPHOCYTES NFR BLD: 30.7 %
MCH RBC QN AUTO: 29.4 PG (ref 26–34)
MCHC RBC AUTO-ENTMCNC: 32.8 G/DL (ref 31–36)
MCV RBC AUTO: 89.7 FL (ref 80–100)
MONOCYTES # BLD: 0.5 K/UL (ref 0–1.3)
MONOCYTES NFR BLD: 8.2 %
NEUTROPHILS # BLD: 3.3 K/UL (ref 1.7–7.7)
NEUTROPHILS NFR BLD: 57.2 %
PLATELET # BLD AUTO: 204 K/UL (ref 135–450)
PMV BLD AUTO: 9.6 FL (ref 5–10.5)
POTASSIUM SERPL-SCNC: 4.7 MMOL/L (ref 3.5–5.1)
PROT SERPL-MCNC: 6.9 G/DL (ref 6.4–8.2)
RBC # BLD AUTO: 4.6 M/UL (ref 4–5.2)
SODIUM SERPL-SCNC: 139 MMOL/L (ref 136–145)
TRIGL SERPL-MCNC: 69 MG/DL (ref 0–150)
TSH SERPL DL<=0.005 MIU/L-ACNC: 1.63 UIU/ML (ref 0.27–4.2)
VLDLC SERPL CALC-MCNC: 14 MG/DL
WBC # BLD AUTO: 5.8 K/UL (ref 4–11)

## 2024-08-01 ENCOUNTER — CARE COORDINATION (OUTPATIENT)
Dept: OTHER | Facility: CLINIC | Age: 34
End: 2024-08-01

## 2024-08-01 LAB
EST. AVERAGE GLUCOSE BLD GHB EST-MCNC: 99.7 MG/DL
HBA1C MFR BLD: 5.1 %

## 2024-08-01 NOTE — CARE COORDINATION
Care Transitions Note    Final Call     Patient Current Location:  Home: [address] [place] [address]    Care Transition Nurse contacted the patient by telephone. Verified name and  as identifiers.    Patient graduated from the Care Transitions program on 24.  Patient/family has the ability to self manage at this time..      Advance Care Planning:   Does patient have an Advance Directive: not on file; [facility].    Handoff:   Patient was not referred to the ACM team due to no additional needs identified.       Care Summary Note: Patient states she is doing very well.  She completed her follow up appt and has been released to return to work tomorrow 24.  She denies any changes in medications or any new issues or concerns.  She denies any ongoing ACM needs at this time.     Assessments:  Care Transitions Subsequent and Final Call    Schedule Follow Up Appointment with PCP: Completed  Subsequent and Final Calls  Do you have any ongoing symptoms?: No  Have your medications changed?: No  Do you have any questions related to your medications?: No  Do you currently have any active services?: No  Do you have any needs or concerns that I can assist you with?: No  Identified Barriers: None  Care Transitions Interventions    Specialty Service Referral: Completed    Other Interventions:              Upcoming Appointments:        Patient has agreed to contact primary care provider and/or specialist for any further questions, concerns, or needs.    Shirley Bartholomew RN

## 2024-08-27 ENCOUNTER — TELEPHONE (OUTPATIENT)
Dept: PHARMACY | Age: 34
End: 2024-08-27

## 2024-08-27 NOTE — TELEPHONE ENCOUNTER
Specialty Medication Service    Date: 8/27/2024  Patient's Name: Melissa Melo YOB: 1990            _____________________________________________________________________________________________    Patient's PA for Dupixent expires on 9/27/24. Patient's specialist, Dr. Patterson, is an external provider. Last notes from specialist on file are from 4/2023 with instructions to follow-up in 6 months. Fax sent to provider's office requesting notes so that they can be submitted for PA.     Fidelia Salazar, Malini  Ambulatory Clinical Pharmacist   Specialty Medication Services   Phone: [phone] option 4  8/27/2024 2:37 PM

## 2024-09-19 ENCOUNTER — TELEPHONE (OUTPATIENT)
Dept: PHARMACY | Age: 34
End: 2024-09-19

## 2024-09-19 DIAGNOSIS — L20.9 ATOPIC DERMATITIS, UNSPECIFIED TYPE: Primary | ICD-10-CM

## 2024-09-19 RX ORDER — DUPILUMAB 300 MG/2ML
300 INJECTION, SOLUTION SUBCUTANEOUS
Qty: 4 ML | Refills: 11 | Status: SHIPPED | OUTPATIENT
Start: 2024-09-19

## 2024-10-10 ENCOUNTER — TELEPHONE (OUTPATIENT)
Dept: PHARMACY | Age: 34
End: 2024-10-10

## 2024-10-10 NOTE — TELEPHONE ENCOUNTER
Specialty Medication Service    Date: 10/10/2024  Patient's Name: Melissa Melo YOB: 1990            _____________________________________________________________________________________________    Spoke with patient to schedule PharmD follow up appointment for Specialty Medication Services. Patient scheduled 11/07/24 at 930.      Radha Lazaro CPhT  Pharmacy   Specialty Medication Services   Phone: [phone] option 4    For Pharmacy Admin Tracking Only    Program: [facility]  CPA in place:  Yes  Recommendation Provided To: Patient/Caregiver: 1 via Telephone  Intervention Detail: Scheduled Appointment  Intervention Accepted By: Patient/Caregiver: 1  Gap Closed?:    Time Spent (min): 15

## 2024-11-01 ENCOUNTER — TELEPHONE (OUTPATIENT)
Dept: PHARMACY | Age: 34
End: 2024-11-01

## 2024-11-01 NOTE — TELEPHONE ENCOUNTER
Specialty Medication Service    Date: 11/1/2024  Patient's Name: Melissa Melo YOB: 1990            _____________________________________________________________________________________________    Called patient's specialist office to request most recent office visit summary and relevant labs for Specialty Medication Service formulary medication.  Sent fax to provider  to have faxed to [phone].     Radha Lazaro CPhT  Pharmacy   Specialty Medication Services   Phone: [phone] option 4    For Pharmacy Admin Tracking Only    Program: [facility]  CPA in place:  Yes  Recommendation Provided To: Provider: 1 via Fax sent to office  Intervention Detail:   Intervention Accepted By: Provider: 0  Gap Closed?:    Time Spent (min): 15

## 2024-11-06 NOTE — PROGRESS NOTES
Specialty Medication Service    Patient's Name: Melissa Melo YOB: 1990      Reason for visit: Melissa Melo is a 34 y.o. female presenting today for Specialty Medication Service visit follow up. Patient last seen by [facility] 5/9/24. Patient continues on [facility] formulary medication, Dupixent. Pharmacy completed Specialty Medication Service visit for medication monitoring and counseling. Medication list updated.    Specialty Medication: Dupixent 300mg/2mL SOSY   Frequency: Every 14 days   Indication: Atopic dermatitis   Initially Diagnosed: childhood   Additional Therapy:   None  Previous Therapy:   Other topical medications   Opzelura 1.5% cream PRN     Specialist:   Estrada Patterson MD  [facility] [address] [place] [address]  P: [phone]  F: [phone]  Specialist Progress Note Available: Yes, scanned in Media tab  Last Specialist Visit:   9/12/24 - no active atopic dermatitis. Continue Dupixent. Follow-up in 6 months.   3/7/24 - today patient reports clearance of dermatitis symptoms. No active rash noted and 0% BSA affected. Continue Dupixent every 2 weeks. Follow-up in 6 months.     No Known Allergies    Past Medical History:   Diagnosis Date    Cervical cancer (HCC)     Eczema     External hemorrhoids     Pregnancy induced hypertension     Pyelonephritis 2011      Social History     Tobacco Use    Smoking status: Never    Smokeless tobacco: Never   Substance Use Topics    Alcohol use: Yes     Alcohol/week: 4.0 standard drinks of alcohol     Types: 4 Standard drinks or equivalent per week     Comment: socailly     Family History   Problem Relation Age of Onset    High Blood Pressure Mother     High Blood Pressure Father     High Blood Pressure Maternal Grandfather     Heart Disease Maternal Grandfather     Diabetes Maternal Grandfather     High Cholesterol Maternal Grandfather     Breast Cancer Paternal Grandmother     High Blood Pressure Paternal Grandfather     Cancer

## 2024-11-07 ENCOUNTER — PHARMACY VISIT (OUTPATIENT)
Dept: PHARMACY | Age: 34
End: 2024-11-07

## 2024-11-07 DIAGNOSIS — L20.9 ATOPIC DERMATITIS, UNSPECIFIED TYPE: Primary | ICD-10-CM

## 2024-11-07 ASSESSMENT — PATIENT HEALTH QUESTIONNAIRE - PHQ9
1. LITTLE INTEREST OR PLEASURE IN DOING THINGS: NOT AT ALL
SUM OF ALL RESPONSES TO PHQ9 QUESTIONS 1 & 2: 0
SUM OF ALL RESPONSES TO PHQ QUESTIONS 1-9: 0
2. FEELING DOWN, DEPRESSED OR HOPELESS: NOT AT ALL
SUM OF ALL RESPONSES TO PHQ QUESTIONS 1-9: 0

## 2025-01-20 ENCOUNTER — TELEPHONE (OUTPATIENT)
Dept: PHARMACY | Age: 35
End: 2025-01-20

## 2025-01-20 NOTE — TELEPHONE ENCOUNTER
Specialty Medication Service    Date: 1/20/2025  Patient's Name: Melissa Melo YOB: 1990            _____________________________________________________________________________________________    Spoke with patient to schedule Medical Director  and PharmD follow up appointment for Specialty Medication Services. Patient scheduled with md on 02/04/25 at 4 pm and with rpcamron on 02/04/25 at 430 pm.      Radha Lazaro CPhT  Pharmacy   Specialty Medication Services   Phone: [phone] option 4    For Pharmacy Admin Tracking Only    Program: [facility]  CPA in place:  Yes  Recommendation Provided To: Patient/Caregiver: 2 via Telephone  Intervention Detail: Scheduled Appointment  Intervention Accepted By: Patient/Caregiver: 2  Gap Closed?:    Time Spent (min): 15

## 2025-01-21 ENCOUNTER — TELEPHONE (OUTPATIENT)
Dept: PHARMACY | Age: 35
End: 2025-01-21

## 2025-01-21 NOTE — TELEPHONE ENCOUNTER
Specialty Medication Service    Date: 1/21/2025  Patient's Name: Melissa Melo YOB: 1990            _____________________________________________________________________________________________    Called patient's specialist office to request most recent office visit summary and relevant labs for Specialty Medication Service formulary medication.  Faxed  to have faxed to [phone].     Radha Lazaro CPhT  Pharmacy   Specialty Medication Services   Phone: [phone] option 4    For Pharmacy Admin Tracking Only    Program: [facility]  CPA in place:  Yes  Recommendation Provided To: Provider: 1 via Fax sent to office  Intervention Detail:   Intervention Accepted By: Provider: 0  Gap Closed?:    Time Spent (min): 15

## 2025-01-23 NOTE — TELEPHONE ENCOUNTER
Specialty Medication Service    Date: 1/23/2025  Patient's Name: Melissa Melo YOB: 1990            _____________________________________________________________________________________________    Notes from 09/2024 already in patient chart. Closing request. Patient moved [facility] visit up to Feb to sync with MD visit. No updated chart notes available at this time. Next visit with specialist will be around March/April/May    Radha Lazaro CPhT  Pharmacy   Specialty Medication Services   Phone: [phone] option 4      For Pharmacy Admin Tracking Only    Program: [facility]  CPA in place:  Yes  Recommendation Provided To: Provider: 1 via Called provider office and Fax sent to office  Intervention Detail:   Intervention Accepted By: Provider: 1  Gap Closed?:    Time Spent (min): 15

## 2025-02-04 ENCOUNTER — PHARMACY VISIT (OUTPATIENT)
Dept: PHARMACY | Age: 35
End: 2025-02-04

## 2025-02-04 DIAGNOSIS — L20.9 ATOPIC DERMATITIS, UNSPECIFIED TYPE: Primary | ICD-10-CM

## 2025-02-04 ASSESSMENT — PROMIS GLOBAL HEALTH SCALE
TO WHAT EXTENT ARE YOU ABLE TO CARRY OUT YOUR EVERYDAY PHYSICAL ACTIVITIES SUCH AS WALKING, CLIMBING STAIRS, CARRYING GROCERIES, OR MOVING A CHAIR [ON A SCALE OF 1 (NOT AT ALL) TO 5 (COMPLETELY)]?: COMPLETELY
IN THE PAST 7 DAYS, HOW WOULD YOU RATE YOUR FATIGUE ON AVERAGE [ON A SCALE FROM 1 (NONE) TO 5 (VERY SEVERE)]?: MODERATE
IN GENERAL, HOW WOULD YOU RATE YOUR PHYSICAL HEALTH [ON A SCALE OF 1 (POOR) TO 5 (EXCELLENT)]?: VERY GOOD
SUM OF RESPONSES TO QUESTIONS 3, 6, 7, & 8: 12
IN THE PAST 7 DAYS, HOW OFTEN HAVE YOU BEEN BOTHERED BY EMOTIONAL PROBLEMS, SUCH AS FEELING ANXIOUS, DEPRESSED, OR IRRITABLE [ON A SCALE FROM 1 (NEVER) TO 5 (ALWAYS)]?: NEVER
IN GENERAL, HOW WOULD YOU RATE YOUR SATISFACTION WITH YOUR SOCIAL ACTIVITIES AND RELATIONSHIPS [ON A SCALE OF 1 (POOR) TO 5 (EXCELLENT)]?: VERY GOOD
IN GENERAL, WOULD YOU SAY YOUR HEALTH IS...[ON A SCALE OF 1 (POOR) TO 5 (EXCELLENT)]: VERY GOOD
IN THE PAST 7 DAYS, HOW WOULD YOU RATE YOUR PAIN ON AVERAGE [ON A SCALE FROM 0 (NO PAIN) TO 10 (WORST IMAGINABLE PAIN)]?: 0 NO PAIN
IN GENERAL, WOULD YOU SAY YOUR QUALITY OF LIFE IS...[ON A SCALE OF 1 (POOR) TO 5 (EXCELLENT)]: VERY GOOD
IN GENERAL, HOW WOULD YOU RATE YOUR MENTAL HEALTH, INCLUDING YOUR MOOD AND YOUR ABILITY TO THINK [ON A SCALE OF 1 (POOR) TO 5 (EXCELLENT)]?: VERY GOOD
SUM OF RESPONSES TO QUESTIONS 2, 4, 5, & 10: 17
IN GENERAL, PLEASE RATE HOW WELL YOU CARRY OUT YOUR USUAL SOCIAL ACTIVITIES (INCLUDES ACTIVITIES AT HOME, AT WORK, AND IN YOUR COMMUNITY, AND RESPONSIBILITIES AS A PARENT, CHILD, SPOUSE, EMPLOYEE, FRIEND, ETC) [ON A SCALE OF 1 (POOR) TO 5 (EXCELLENT)]?: VERY GOOD

## 2025-02-04 NOTE — PROGRESS NOTES
I called the patient  for annual follow-up for the [facility] subspecialty pharmacy program.    The patient has a history of topic dermatitis. The patient is under the care of dermatology.    The patient says the medication is working well. The patient has no major side effects from the medication. The patient takes Dupixent.  The patient is getting lab work regularly.    The patient has had no issues getting the medication from the pharmacy.    I told the patient that primary management will be by the patient's specialist. The patient verbalized understanding.  I told the patient that I collaborate closely with the pharmacist in the care of the patient and to contact us with any issues.       GAB DYE MD 2/4/2025 4:00 PM

## 2025-02-04 NOTE — PROGRESS NOTES
Specialty Medication Service    Patient's Name: Melissa Melo YOB: 1990      Reason for visit: Melissa Melo is a 34 y.o. female presenting today for Specialty Medication Service visit follow up. Patient last seen by [facility] 11/7/2024. Patient continues on [facility] formulary medication, Dupixent. Pharmacy completed Specialty Medication Service visit for medication monitoring and counseling. Medication list updated.    Specialty Medication: Dupixent 300mg/2mL SOSY   Frequency: Every 14 days   Indication: Atopic dermatitis   Initially Diagnosed: childhood   Additional Therapy:   None  Previous Therapy:   Other topical medications   Opzelura 1.5% cream PRN     Specialist:   Estrada Patterson MD  [facility] [address] [place] [address]  P: [phone]  F: [phone]  Specialist Progress Note Available: Yes, scanned in Media tab  Last Specialist Visit:   9/12/24 - no active atopic dermatitis. Continue Dupixent. Follow-up in 6 months.    No Known Allergies    Past Medical History:   Diagnosis Date    Cervical cancer (HCC)     Eczema     External hemorrhoids     Pregnancy induced hypertension     Pyelonephritis 2011      Social History     Tobacco Use    Smoking status: Never    Smokeless tobacco: Never   Substance Use Topics    Alcohol use: Yes     Alcohol/week: 4.0 standard drinks of alcohol     Types: 4 Standard drinks or equivalent per week     Comment: socailly     Family History   Problem Relation Age of Onset    High Blood Pressure Mother     High Blood Pressure Father     High Blood Pressure Maternal Grandfather     Heart Disease Maternal Grandfather     Diabetes Maternal Grandfather     High Cholesterol Maternal Grandfather     Breast Cancer Paternal Grandmother     High Blood Pressure Paternal Grandfather     Cancer Maternal Aunt      INTERM HISTORY  Have you been diagnosed with any additional conditions since we last talked? no  Have you developed any new allergies since we

## 2025-07-10 ENCOUNTER — TELEPHONE (OUTPATIENT)
Dept: PHARMACY | Age: 35
End: 2025-07-10

## 2025-07-10 NOTE — TELEPHONE ENCOUNTER
Specialty Medication Service    Date: 7/10/2025  Patient's Name: Melissa Melo YOB: 1990            _____________________________________________________________________________________________    Spoke with patient to schedule PharmD follow up appointment for Specialty Medication Services. Patient scheduled 07/31/25 at 1030 am.     Radha Lazaro CPhT  Pharmacy   Specialty Medication Services   Phone: [phone] option 4    For Pharmacy Admin Tracking Only    Program: [facility]  CPA in place:  Yes  Recommendation Provided To: Patient/Caregiver: 1 via Telephone  Intervention Detail: Scheduled Appointment  Intervention Accepted By: Patient/Caregiver: 1  Gap Closed?:    Time Spent (min): 15

## 2025-07-28 ASSESSMENT — PATIENT HEALTH QUESTIONNAIRE - PHQ9
SUM OF ALL RESPONSES TO PHQ9 QUESTIONS 1 & 2: 0
2. FEELING DOWN, DEPRESSED OR HOPELESS: NOT AT ALL
SUM OF ALL RESPONSES TO PHQ QUESTIONS 1-9: 0
SUM OF ALL RESPONSES TO PHQ QUESTIONS 1-9: 0
1. LITTLE INTEREST OR PLEASURE IN DOING THINGS: NOT AT ALL
2. FEELING DOWN, DEPRESSED OR HOPELESS: NOT AT ALL
1. LITTLE INTEREST OR PLEASURE IN DOING THINGS: NOT AT ALL
SUM OF ALL RESPONSES TO PHQ QUESTIONS 1-9: 0
SUM OF ALL RESPONSES TO PHQ QUESTIONS 1-9: 0

## 2025-07-28 ASSESSMENT — PROMIS GLOBAL HEALTH SCALE
WHO IS THE PERSON COMPLETING THE PROMIS V1.1 SURVEY?: SELF
IN GENERAL, WOULD YOU SAY YOUR HEALTH IS...[ON A SCALE OF 1 (POOR) TO 5 (EXCELLENT)]: VERY GOOD
IN GENERAL, HOW WOULD YOU RATE YOUR MENTAL HEALTH, INCLUDING YOUR MOOD AND YOUR ABILITY TO THINK [ON A SCALE OF 1 (POOR) TO 5 (EXCELLENT)]?: EXCELLENT
IN THE PAST 7 DAYS, HOW OFTEN HAVE YOU BEEN BOTHERED BY EMOTIONAL PROBLEMS, SUCH AS FEELING ANXIOUS, DEPRESSED, OR IRRITABLE [ON A SCALE FROM 1 (NEVER) TO 5 (ALWAYS)]?: NEVER
IN GENERAL, HOW WOULD YOU RATE YOUR PHYSICAL HEALTH [ON A SCALE OF 1 (POOR) TO 5 (EXCELLENT)]?: EXCELLENT
IN GENERAL, PLEASE RATE HOW WELL YOU CARRY OUT YOUR USUAL SOCIAL ACTIVITIES (INCLUDES ACTIVITIES AT HOME, AT WORK, AND IN YOUR COMMUNITY, AND RESPONSIBILITIES AS A PARENT, CHILD, SPOUSE, EMPLOYEE, FRIEND, ETC) [ON A SCALE OF 1 (POOR) TO 5 (EXCELLENT)]?: EXCELLENT
IN THE PAST 7 DAYS, HOW WOULD YOU RATE YOUR PAIN ON AVERAGE [ON A SCALE FROM 0 (NO PAIN) TO 10 (WORST IMAGINABLE PAIN)]?: 0 NO PAIN
TO WHAT EXTENT ARE YOU ABLE TO CARRY OUT YOUR EVERYDAY PHYSICAL ACTIVITIES SUCH AS WALKING, CLIMBING STAIRS, CARRYING GROCERIES, OR MOVING A CHAIR [ON A SCALE OF 1 (NOT AT ALL) TO 5 (COMPLETELY)]?: COMPLETELY
HOW IS THE PROMIS V1.1 BEING ADMINISTERED?: ELECTRONIC
IN GENERAL, PLEASE RATE HOW WELL YOU CARRY OUT YOUR USUAL SOCIAL ACTIVITIES (INCLUDES ACTIVITIES AT HOME, AT WORK, AND IN YOUR COMMUNITY, AND RESPONSIBILITIES AS A PARENT, CHILD, SPOUSE, EMPLOYEE, FRIEND, ETC) [ON A SCALE OF 1 (POOR) TO 5 (EXCELLENT)]?: EXCELLENT
HOW IS THE PROMIS V1.1 BEING ADMINISTERED?: ELECTRONIC
IN THE PAST 7 DAYS, HOW WOULD YOU RATE YOUR FATIGUE ON AVERAGE [ON A SCALE FROM 1 (NONE) TO 5 (VERY SEVERE)]?: MILD
SUM OF RESPONSES TO QUESTIONS 3, 6, 7, & 8: 14
IN THE PAST 7 DAYS, HOW WOULD YOU RATE YOUR PAIN ON AVERAGE [ON A SCALE FROM 0 (NO PAIN) TO 10 (WORST IMAGINABLE PAIN)]?: 0 NO PAIN
IN GENERAL, HOW WOULD YOU RATE YOUR SATISFACTION WITH YOUR SOCIAL ACTIVITIES AND RELATIONSHIPS [ON A SCALE OF 1 (POOR) TO 5 (EXCELLENT)]?: EXCELLENT
SUM OF RESPONSES TO QUESTIONS 2, 4, 5, & 10: 20
TO WHAT EXTENT ARE YOU ABLE TO CARRY OUT YOUR EVERYDAY PHYSICAL ACTIVITIES SUCH AS WALKING, CLIMBING STAIRS, CARRYING GROCERIES, OR MOVING A CHAIR [ON A SCALE OF 1 (NOT AT ALL) TO 5 (COMPLETELY)]?: COMPLETELY
IN GENERAL, WOULD YOU SAY YOUR QUALITY OF LIFE IS...[ON A SCALE OF 1 (POOR) TO 5 (EXCELLENT)]: EXCELLENT
WHO IS THE PERSON COMPLETING THE PROMIS V1.1 SURVEY?: SELF
IN THE PAST 7 DAYS, HOW WOULD YOU RATE YOUR FATIGUE ON AVERAGE [ON A SCALE FROM 1 (NONE) TO 5 (VERY SEVERE)]?: MILD
IN THE PAST 7 DAYS, HOW OFTEN HAVE YOU BEEN BOTHERED BY EMOTIONAL PROBLEMS, SUCH AS FEELING ANXIOUS, DEPRESSED, OR IRRITABLE [ON A SCALE FROM 1 (NEVER) TO 5 (ALWAYS)]?: NEVER
IN GENERAL, HOW WOULD YOU RATE YOUR SATISFACTION WITH YOUR SOCIAL ACTIVITIES AND RELATIONSHIPS [ON A SCALE OF 1 (POOR) TO 5 (EXCELLENT)]?: EXCELLENT

## 2025-07-31 ENCOUNTER — OFFICE VISIT (OUTPATIENT)
Dept: FAMILY MEDICINE CLINIC | Age: 35
End: 2025-07-31
Payer: COMMERCIAL

## 2025-07-31 ENCOUNTER — PHARMACY VISIT (OUTPATIENT)
Dept: PHARMACY | Age: 35
End: 2025-07-31

## 2025-07-31 VITALS
BODY MASS INDEX: 27.97 KG/M2 | OXYGEN SATURATION: 98 % | SYSTOLIC BLOOD PRESSURE: 116 MMHG | WEIGHT: 152 LBS | HEART RATE: 80 BPM | HEIGHT: 62 IN | DIASTOLIC BLOOD PRESSURE: 74 MMHG

## 2025-07-31 DIAGNOSIS — Z00.00 ANNUAL PHYSICAL EXAM: Primary | ICD-10-CM

## 2025-07-31 DIAGNOSIS — Z85.41 HISTORY OF CERVICAL CANCER: ICD-10-CM

## 2025-07-31 DIAGNOSIS — L20.9 ATOPIC DERMATITIS, UNSPECIFIED TYPE: Primary | ICD-10-CM

## 2025-07-31 DIAGNOSIS — Z90.710 S/P LAPAROSCOPIC HYSTERECTOMY: ICD-10-CM

## 2025-07-31 PROCEDURE — 99395 PREV VISIT EST AGE 18-39: CPT | Performed by: FAMILY MEDICINE

## 2025-07-31 SDOH — ECONOMIC STABILITY: FOOD INSECURITY: WITHIN THE PAST 12 MONTHS, YOU WORRIED THAT YOUR FOOD WOULD RUN OUT BEFORE YOU GOT MONEY TO BUY MORE.: NEVER TRUE

## 2025-07-31 SDOH — ECONOMIC STABILITY: FOOD INSECURITY: WITHIN THE PAST 12 MONTHS, THE FOOD YOU BOUGHT JUST DIDN'T LAST AND YOU DIDN'T HAVE MONEY TO GET MORE.: NEVER TRUE

## 2025-07-31 NOTE — PROGRESS NOTES
Chief Complaint:   Melissa Melo is a 35 y.o. female who presents for complete physical examination.    History of Present Illness:    Hx cervical cancer s/p hysterectomy. Had follow up with gyn last month with rpeat pap and US.   Still has occasional LLQ pain, was told had cyst on US.      Past Medical History:   Diagnosis Date    Cervical cancer (HCC)     Eczema     External hemorrhoids     Postpartum hemorrhoids 05/15/2017    Pregnancy induced hypertension     Pyelonephritis 2011       Past Surgical History:   Procedure Laterality Date    HYSTERECTOMY (CERVIX STATUS UNKNOWN) N/A 6/19/2024    ROBOTIC ASSISTED LAPAROSCOPIC TOTAL HYSTERECTOMY, BILATERAL SALPINGECTOMY, RIGHT OVARIAN CYSTECTOMY performed by Skinny Govea DO at [facility] OR    LEEP N/A 4/16/2024    LOOP ELECTRICAL EXCISION PROCEDURE WITH CONIZATION OF CERVIX performed by Vadim Sloan MD at [facility] OR    WISDOM TOOTH EXTRACTION         Outpatient Medications Marked as Taking for the 7/31/25 encounter (Office Visit) with Danyell Gustafson MD   Medication Sig Dispense Refill    DUPIXENT 300 MG/2ML SOSY injection Inject 2 mLs into the skin every 14 days 4 mL 11     No Known Allergies    Social History     Socioeconomic History    Marital status:     Number of children: 2   Occupational History    Occupation: speech therapist   Tobacco Use    Smoking status: Never    Smokeless tobacco: Never   Vaping Use    Vaping status: Never Used   Substance and Sexual Activity    Alcohol use: Yes     Alcohol/week: 4.0 standard drinks of alcohol     Types: 4 Standard drinks or equivalent per week     Comment: socailly    Drug use: No    Sexual activity: Yes     Partners: Male     Birth control/protection: OCP     Social Drivers of Health     Financial Resource Strain: Low Risk  (5/30/2024)    Overall Financial Resource Strain (CARDIA)     Difficulty of Paying Living Expenses: Not hard at all   Food Insecurity: No Food Insecurity (7/31/2025)    Hunger Vital Sign

## 2025-08-01 ENCOUNTER — TELEPHONE (OUTPATIENT)
Dept: PHARMACY | Age: 35
End: 2025-08-01

## 2025-08-01 NOTE — TELEPHONE ENCOUNTER
Specialty Medication Service    Date: 8/1/2025  Patient's Name: Melissa Melo YOB: 1990            _____________________________________________________________________________________________    Inbound fax received from external provider containing patient medical records requested by SMS. Patient identifiers confirmed on each page to ensure accuracy and protection of privacy. Imported into the chart media, PharmD aware notes are available for viewing.    Radha Lazaro CPhT  Pharmacy   Specialty Medication Services   Phone: [phone] option 4      For Pharmacy Admin Tracking Only    Program: [facility]  CPA in place:  Yes  Recommendation Provided To: Provider: 1 via Called provider office  Intervention Detail:   Intervention Accepted By: Provider: 1  Gap Closed?:    Time Spent (min): 15     labs pending

## 2025-08-07 DIAGNOSIS — Z00.00 ANNUAL PHYSICAL EXAM: ICD-10-CM

## 2025-08-07 DIAGNOSIS — L20.9 ATOPIC DERMATITIS, UNSPECIFIED TYPE: ICD-10-CM

## 2025-08-08 LAB
ALBUMIN SERPL-MCNC: 4.3 G/DL (ref 3.4–5)
ALBUMIN/GLOB SERPL: 1.8 {RATIO} (ref 1.1–2.2)
ALP SERPL-CCNC: 42 U/L (ref 40–129)
ALT SERPL-CCNC: 17 U/L (ref 10–40)
ANION GAP SERPL CALCULATED.3IONS-SCNC: 10 MMOL/L (ref 3–16)
AST SERPL-CCNC: 18 U/L (ref 15–37)
BASOPHILS # BLD: 0 K/UL (ref 0–0.2)
BASOPHILS NFR BLD: 0.6 %
BILIRUB SERPL-MCNC: 0.4 MG/DL (ref 0–1)
BUN SERPL-MCNC: 14 MG/DL (ref 7–20)
CALCIUM SERPL-MCNC: 9.2 MG/DL (ref 8.3–10.6)
CHLORIDE SERPL-SCNC: 105 MMOL/L (ref 99–110)
CHOLEST SERPL-MCNC: 149 MG/DL (ref 0–199)
CO2 SERPL-SCNC: 24 MMOL/L (ref 21–32)
CREAT SERPL-MCNC: 0.7 MG/DL (ref 0.6–1.1)
DEPRECATED RDW RBC AUTO: 12.4 % (ref 12.4–15.4)
EOSINOPHIL # BLD: 0.1 K/UL (ref 0–0.6)
EOSINOPHIL NFR BLD: 1.5 %
GFR SERPLBLD CREATININE-BSD FMLA CKD-EPI: >90 ML/MIN/{1.73_M2}
GLUCOSE SERPL-MCNC: 88 MG/DL (ref 70–99)
HCT VFR BLD AUTO: 42 % (ref 36–48)
HDLC SERPL-MCNC: 53 MG/DL (ref 40–60)
HGB BLD-MCNC: 14.2 G/DL (ref 12–16)
LDLC SERPL CALC-MCNC: 86 MG/DL
LYMPHOCYTES # BLD: 1.8 K/UL (ref 1–5.1)
LYMPHOCYTES NFR BLD: 36.3 %
MCH RBC QN AUTO: 29.6 PG (ref 26–34)
MCHC RBC AUTO-ENTMCNC: 33.7 G/DL (ref 31–36)
MCV RBC AUTO: 87.8 FL (ref 80–100)
MONOCYTES # BLD: 0.5 K/UL (ref 0–1.3)
MONOCYTES NFR BLD: 9.6 %
NEUTROPHILS # BLD: 2.6 K/UL (ref 1.7–7.7)
NEUTROPHILS NFR BLD: 52 %
PLATELET # BLD AUTO: 225 K/UL (ref 135–450)
PMV BLD AUTO: 10.1 FL (ref 5–10.5)
POTASSIUM SERPL-SCNC: 4.3 MMOL/L (ref 3.5–5.1)
PROT SERPL-MCNC: 6.7 G/DL (ref 6.4–8.2)
RBC # BLD AUTO: 4.79 M/UL (ref 4–5.2)
SODIUM SERPL-SCNC: 139 MMOL/L (ref 136–145)
TRIGL SERPL-MCNC: 51 MG/DL (ref 0–150)
TSH SERPL DL<=0.005 MIU/L-ACNC: 1.75 UIU/ML (ref 0.27–4.2)
VLDLC SERPL CALC-MCNC: 10 MG/DL
WBC # BLD AUTO: 5.1 K/UL (ref 4–11)

## 2025-08-08 RX ORDER — DUPILUMAB 300 MG/2ML
INJECTION, SOLUTION SUBCUTANEOUS
Qty: 4 ML | Refills: 2 | Status: SHIPPED | OUTPATIENT
Start: 2025-08-08

## 2025-08-13 ENCOUNTER — APPOINTMENT (OUTPATIENT)
Dept: ULTRASOUND IMAGING | Age: 35
End: 2025-08-13
Payer: COMMERCIAL

## 2025-08-13 ENCOUNTER — APPOINTMENT (OUTPATIENT)
Dept: CT IMAGING | Age: 35
End: 2025-08-13
Payer: COMMERCIAL

## 2025-08-13 ENCOUNTER — HOSPITAL ENCOUNTER (EMERGENCY)
Age: 35
Discharge: HOME | End: 2025-08-13
Attending: STUDENT IN AN ORGANIZED HEALTH CARE EDUCATION/TRAINING PROGRAM
Payer: COMMERCIAL

## 2025-08-13 VITALS
HEART RATE: 72 BPM | BODY MASS INDEX: 26.63 KG/M2 | OXYGEN SATURATION: 99 % | RESPIRATION RATE: 20 BRPM | HEIGHT: 63 IN | SYSTOLIC BLOOD PRESSURE: 107 MMHG | WEIGHT: 150.3 LBS | TEMPERATURE: 98.4 F | DIASTOLIC BLOOD PRESSURE: 68 MMHG

## 2025-08-13 DIAGNOSIS — R10.32 ABDOMINAL PAIN, LEFT LOWER QUADRANT: ICD-10-CM

## 2025-08-13 DIAGNOSIS — N83.8 LEFT OVARIAN ENLARGEMENT: Primary | ICD-10-CM

## 2025-08-13 LAB
ALBUMIN SERPL-MCNC: 4.2 G/DL (ref 3.4–5)
ALBUMIN/GLOB SERPL: 1.4 {RATIO} (ref 1.1–2.2)
ALP SERPL-CCNC: 44 U/L (ref 40–129)
ALT SERPL-CCNC: 13 U/L (ref 10–40)
ANION GAP SERPL CALCULATED.3IONS-SCNC: 12 MMOL/L (ref 3–16)
AST SERPL-CCNC: 21 U/L (ref 15–37)
BASOPHILS # BLD: 0 K/UL (ref 0–0.2)
BASOPHILS NFR BLD: 0.6 %
BILIRUB SERPL-MCNC: 0.7 MG/DL (ref 0–1)
BILIRUB UR QL STRIP.AUTO: NEGATIVE
BUN SERPL-MCNC: 13 MG/DL (ref 7–20)
CALCIUM SERPL-MCNC: 9.3 MG/DL (ref 8.3–10.6)
CHLORIDE SERPL-SCNC: 103 MMOL/L (ref 99–110)
CLARITY UR: CLEAR
CO2 SERPL-SCNC: 22 MMOL/L (ref 21–32)
COLOR UR: YELLOW
CREAT SERPL-MCNC: 0.7 MG/DL (ref 0.6–1.1)
DEPRECATED RDW RBC AUTO: 12.2 % (ref 12.4–15.4)
EOSINOPHIL # BLD: 0 K/UL (ref 0–0.6)
EOSINOPHIL NFR BLD: 0.6 %
GFR SERPLBLD CREATININE-BSD FMLA CKD-EPI: >90 ML/MIN/{1.73_M2}
GLUCOSE SERPL-MCNC: 93 MG/DL (ref 70–99)
GLUCOSE UR STRIP.AUTO-MCNC: NEGATIVE MG/DL
HCT VFR BLD AUTO: 42.5 % (ref 36–48)
HGB BLD-MCNC: 14.4 G/DL (ref 12–16)
HGB UR QL STRIP.AUTO: NEGATIVE
KETONES UR STRIP.AUTO-MCNC: 40 MG/DL
LACTATE BLDV-SCNC: 0.7 MMOL/L (ref 0.4–2)
LEUKOCYTE ESTERASE UR QL STRIP.AUTO: NEGATIVE
LIPASE SERPL-CCNC: 37 U/L (ref 13–60)
LYMPHOCYTES # BLD: 1.6 K/UL (ref 1–5.1)
LYMPHOCYTES NFR BLD: 29.2 %
MCH RBC QN AUTO: 29.4 PG (ref 26–34)
MCHC RBC AUTO-ENTMCNC: 34 G/DL (ref 31–36)
MCV RBC AUTO: 86.6 FL (ref 80–100)
MONOCYTES # BLD: 0.4 K/UL (ref 0–1.3)
MONOCYTES NFR BLD: 6.7 %
NEUTROPHILS # BLD: 3.5 K/UL (ref 1.7–7.7)
NEUTROPHILS NFR BLD: 62.9 %
NITRITE UR QL STRIP.AUTO: NEGATIVE
PH UR STRIP.AUTO: 6 [PH] (ref 5–8)
PLATELET # BLD AUTO: 226 K/UL (ref 135–450)
PMV BLD AUTO: 8.9 FL (ref 5–10.5)
POTASSIUM SERPL-SCNC: 4 MMOL/L (ref 3.5–5.1)
PROT SERPL-MCNC: 7.2 G/DL (ref 6.4–8.2)
PROT UR STRIP.AUTO-MCNC: NEGATIVE MG/DL
RBC # BLD AUTO: 4.9 M/UL (ref 4–5.2)
SODIUM SERPL-SCNC: 137 MMOL/L (ref 136–145)
SP GR UR STRIP.AUTO: >=1.03 (ref 1–1.03)
UA COMPLETE W REFLEX CULTURE PNL UR: ABNORMAL
UA DIPSTICK W REFLEX MICRO PNL UR: ABNORMAL
URN SPEC COLLECT METH UR: ABNORMAL
UROBILINOGEN UR STRIP-ACNC: 0.2 E.U./DL
WBC # BLD AUTO: 5.5 K/UL (ref 4–11)

## 2025-08-13 PROCEDURE — 83605 ASSAY OF LACTIC ACID: CPT

## 2025-08-13 PROCEDURE — 74177 CT ABD & PELVIS W/CONTRAST: CPT

## 2025-08-13 PROCEDURE — 83690 ASSAY OF LIPASE: CPT

## 2025-08-13 PROCEDURE — 99285 EMERGENCY DEPT VISIT HI MDM: CPT

## 2025-08-13 PROCEDURE — 93975 VASCULAR STUDY: CPT

## 2025-08-13 PROCEDURE — 6370000000 HC RX 637 (ALT 250 FOR IP): Performed by: STUDENT IN AN ORGANIZED HEALTH CARE EDUCATION/TRAINING PROGRAM

## 2025-08-13 PROCEDURE — 6360000002 HC RX W HCPCS: Performed by: PHYSICIAN ASSISTANT

## 2025-08-13 PROCEDURE — 96375 TX/PRO/DX INJ NEW DRUG ADDON: CPT

## 2025-08-13 PROCEDURE — 80053 COMPREHEN METABOLIC PANEL: CPT

## 2025-08-13 PROCEDURE — 76830 TRANSVAGINAL US NON-OB: CPT

## 2025-08-13 PROCEDURE — 81003 URINALYSIS AUTO W/O SCOPE: CPT

## 2025-08-13 PROCEDURE — 96374 THER/PROPH/DIAG INJ IV PUSH: CPT

## 2025-08-13 PROCEDURE — 6360000004 HC RX CONTRAST MEDICATION: Performed by: PHYSICIAN ASSISTANT

## 2025-08-13 PROCEDURE — 85025 COMPLETE CBC W/AUTO DIFF WBC: CPT

## 2025-08-13 RX ORDER — IOPAMIDOL 755 MG/ML
75 INJECTION, SOLUTION INTRAVASCULAR
Status: COMPLETED | OUTPATIENT
Start: 2025-08-13 | End: 2025-08-13

## 2025-08-13 RX ORDER — ACETAMINOPHEN AND CODEINE PHOSPHATE 300; 30 MG/1; MG/1
1 TABLET ORAL EVERY 4 HOURS PRN
Qty: 20 TABLET | Refills: 0 | Status: SHIPPED | OUTPATIENT
Start: 2025-08-13 | End: 2025-08-16

## 2025-08-13 RX ORDER — HYDROMORPHONE HYDROCHLORIDE 1 MG/ML
1 INJECTION, SOLUTION INTRAMUSCULAR; INTRAVENOUS; SUBCUTANEOUS ONCE
Refills: 0 | Status: COMPLETED | OUTPATIENT
Start: 2025-08-13 | End: 2025-08-13

## 2025-08-13 RX ORDER — HYDROCODONE BITARTRATE AND ACETAMINOPHEN 5; 325 MG/1; MG/1
1 TABLET ORAL ONCE
Status: COMPLETED | OUTPATIENT
Start: 2025-08-13 | End: 2025-08-13

## 2025-08-13 RX ORDER — ONDANSETRON 2 MG/ML
4 INJECTION INTRAMUSCULAR; INTRAVENOUS ONCE
Status: COMPLETED | OUTPATIENT
Start: 2025-08-13 | End: 2025-08-13

## 2025-08-13 RX ORDER — KETOROLAC TROMETHAMINE 30 MG/ML
30 INJECTION, SOLUTION INTRAMUSCULAR; INTRAVENOUS ONCE
Status: COMPLETED | OUTPATIENT
Start: 2025-08-13 | End: 2025-08-13

## 2025-08-13 RX ADMIN — HYDROCODONE BITARTRATE AND ACETAMINOPHEN 1 TABLET: 5; 325 TABLET ORAL at 18:47

## 2025-08-13 RX ADMIN — ONDANSETRON 4 MG: 2 INJECTION, SOLUTION INTRAMUSCULAR; INTRAVENOUS at 14:01

## 2025-08-13 RX ADMIN — KETOROLAC TROMETHAMINE 30 MG: 30 INJECTION INTRAMUSCULAR; INTRAVENOUS at 14:02

## 2025-08-13 RX ADMIN — IOPAMIDOL 75 ML: 755 INJECTION, SOLUTION INTRAVENOUS at 14:20

## 2025-08-13 RX ADMIN — HYDROMORPHONE HYDROCHLORIDE 1 MG: 1 INJECTION, SOLUTION INTRAMUSCULAR; INTRAVENOUS; SUBCUTANEOUS at 14:02

## 2025-08-13 ASSESSMENT — PAIN - FUNCTIONAL ASSESSMENT
PAIN_FUNCTIONAL_ASSESSMENT: 0-10

## 2025-08-13 ASSESSMENT — PAIN SCALES - GENERAL
PAINLEVEL_OUTOF10: 3
PAINLEVEL_OUTOF10: 9
PAINLEVEL_OUTOF10: 9
PAINLEVEL_OUTOF10: 5

## 2025-08-13 ASSESSMENT — PAIN DESCRIPTION - LOCATION: LOCATION: ABDOMEN

## 2025-08-13 ASSESSMENT — ENCOUNTER SYMPTOMS
CHEST TIGHTNESS: 0
DIARRHEA: 0
VOMITING: 0
NAUSEA: 1
ABDOMINAL PAIN: 1
SHORTNESS OF BREATH: 0

## 2025-08-13 ASSESSMENT — PAIN DESCRIPTION - ORIENTATION: ORIENTATION: LEFT;LOWER

## 2025-08-14 ENCOUNTER — CARE COORDINATION (OUTPATIENT)
Dept: OTHER | Facility: CLINIC | Age: 35
End: 2025-08-14

## 2025-08-22 ENCOUNTER — CARE COORDINATION (OUTPATIENT)
Dept: OTHER | Facility: CLINIC | Age: 35
End: 2025-08-22

## 2025-09-04 ENCOUNTER — TELEPHONE (OUTPATIENT)
Dept: PHARMACY | Age: 35
End: 2025-09-04

## (undated) DEVICE — TUBING, SUCTION, 1/4" X 12', STRAIGHT: Brand: MEDLINE

## (undated) DEVICE — ROBOTIC: Brand: MEDLINE INDUSTRIES, INC.

## (undated) DEVICE — SYRINGE MED 10ML LUERLOCK TIP W/O SFTY DISP

## (undated) DEVICE — SPONGE,LAP,18"X18",DLX,XR,ST,5/PK,40/PK: Brand: MEDLINE

## (undated) DEVICE — PAD,NON-ADHERENT,3X8,STERILE,LF,1/PK: Brand: MEDLINE

## (undated) DEVICE — Device

## (undated) DEVICE — SEAL

## (undated) DEVICE — TOWEL,STOP FLAG GOLD N-W: Brand: MEDLINE

## (undated) DEVICE — PUMP SUC IRR TBNG L10FT W/ HNDPC ASSEMB STRYKEFLOW 2

## (undated) DEVICE — PROTECTOR EYE AD FOAM RIG IGUARD

## (undated) DEVICE — ELECTRODE LOOP 12X20MM SHFT L5IN DIA3/32IN LEEP LLETZ

## (undated) DEVICE — PREMIUM WET SKIN PREP TRAY: Brand: MEDLINE INDUSTRIES, INC.

## (undated) DEVICE — SPONGE GZ W4XL4IN COT 12 PLY TYP VII WVN C FLD DSGN STERILE

## (undated) DEVICE — INSUFFLATION NEEDLE TO ESTABLISH PNEUMOPERITONEUM.: Brand: INSUFFLATION NEEDLE

## (undated) DEVICE — TRAP FLUID

## (undated) DEVICE — SCISSORS SURG DIA8MM MPLR CRV ENDOWRIST

## (undated) DEVICE — SOLUTION ANTIFOG VIS SYS CLEARIFY LAPSCP

## (undated) DEVICE — MATERNITY PAD,HEAVY: Brand: CURITY

## (undated) DEVICE — APPLICATOR MEDICATED 26 CC SOLUTION HI LT ORNG CHLORAPREP

## (undated) DEVICE — POSITIONER HD AD W4.5XH8XL9IN HIGHLY RESILIENT FOAM CMFRT

## (undated) DEVICE — LEGGINGS, PAIR, 31X48, STERILE: Brand: MEDLINE

## (undated) DEVICE — GLOVE ORANGE PI 7   MSG9070

## (undated) DEVICE — SUTURE PDS + SZ 0 L27IN ABSRB VLT L26MM CT 2 1 2 CIR PDP334H

## (undated) DEVICE — SLIPCOVER HUG A VAC

## (undated) DEVICE — 1LYRTR 16FR10ML100%SIL UMS SNP: Brand: MEDLINE INDUSTRIES, INC.

## (undated) DEVICE — SOLUTION IRRIG 1000ML 0.9% SOD CHL USP POUR PLAS BTL

## (undated) DEVICE — GOWN,SIRUS,POLYRNF,BRTHSLV,XL,30/CS: Brand: MEDLINE

## (undated) DEVICE — ARM DRAPE

## (undated) DEVICE — LIQUIBAND RAPID ADHESIVE 36/CS 0.8ML: Brand: MEDLINE

## (undated) DEVICE — TRAP SPEC COLL 40CC MUCOUS CALIB UNIV CONN FOR OPN SUCT

## (undated) DEVICE — ELECTRODE BALL DIA5MM TUNGSTEN LLETZ DURABLE RESIST

## (undated) DEVICE — SWAB FBR TIP L16IN PLAS RAYON TIP FOR OB GYN PROCTOSCOPIC

## (undated) DEVICE — FENESTRATED BIPOLAR FORCEPS: Brand: ENDOWRIST

## (undated) DEVICE — GLOVE SURG SZ 7 L12IN FNGR THK79MIL GRN LTX FREE

## (undated) DEVICE — STERILE SURGICAL LUBRICANT, METAL TUBE: Brand: SURGILUBE

## (undated) DEVICE — SPONGE LAP REG 18X18IN

## (undated) DEVICE — PROGRASP FORCEPS: Brand: ENDOWRIST

## (undated) DEVICE — NEPTUNE E-SEP SMOKE EVACUATION PENCIL, COATED, 70MM BLADE, PUSH BUTTON SWITCH: Brand: NEPTUNE E-SEP

## (undated) DEVICE — SYRINGE MED 10ML SLIP TIP BLNT FILL AND LUERLOCK DISP

## (undated) DEVICE — BLADELESS OBTURATOR: Brand: WECK VISTA

## (undated) DEVICE — SUTURE PERMA-HAND SZ 2-0 L30IN NONABSORBABLE BLK L26MM SH K833H

## (undated) DEVICE — NEEDLE 25GA X 1.5 IN ECLIPSE

## (undated) DEVICE — MEGA SUTURECUT ND: Brand: ENDOWRIST

## (undated) DEVICE — DRAPE,UNDERBUTTOCKS,PCH,STERILE: Brand: MEDLINE

## (undated) DEVICE — 3M™ IOBAN™ 2 ANTIMICROBIAL INCISE DRAPE 6648EZ: Brand: IOBAN™ 2

## (undated) DEVICE — MERCY HEALTH WEST TURNOVER: Brand: MEDLINE INDUSTRIES, INC.

## (undated) DEVICE — SYSTEM SMK EVAC LAP TBNG FILTER HSNG BENT STYL PNK SEE CLR

## (undated) DEVICE — 3M™ IOBAN™ 2 ANTIMICROBIAL INCISE DRAPE 6650EZ: Brand: IOBAN™ 2

## (undated) DEVICE — GLOVE SURG SZ 8 L12IN FNGR THK79MIL GRN LTX FREE

## (undated) DEVICE — TIP COVER ACCESSORY

## (undated) DEVICE — SUTURE MONOCRYL + SZ 4-0 L18IN ABSRB UD L19MM PS-2 3/8 CIR MCP496G

## (undated) DEVICE — TISSUE RETRIEVAL SYSTEM: Brand: INZII RETRIEVAL SYSTEM

## (undated) DEVICE — COVER LT HNDL BLU PLAS

## (undated) DEVICE — VCARE LARGE, UTERINE MANIPULATOR, VAGINAL-CERVICAL-AHLUWALIA'S-RETRACTOR-ELEVATOR: Brand: VCARE

## (undated) DEVICE — GLOVE SURG SZ 7 CRM LTX FREE POLYISOPRENE POLYMER BEAD ANTI